# Patient Record
Sex: MALE | Race: BLACK OR AFRICAN AMERICAN | Employment: UNEMPLOYED | RURAL
[De-identification: names, ages, dates, MRNs, and addresses within clinical notes are randomized per-mention and may not be internally consistent; named-entity substitution may affect disease eponyms.]

---

## 2019-01-25 ENCOUNTER — OFFICE VISIT (OUTPATIENT)
Dept: FAMILY MEDICINE CLINIC | Age: 54
End: 2019-01-25

## 2019-01-25 VITALS
HEIGHT: 72 IN | SYSTOLIC BLOOD PRESSURE: 151 MMHG | WEIGHT: 237 LBS | RESPIRATION RATE: 20 BRPM | TEMPERATURE: 98.5 F | HEART RATE: 83 BPM | DIASTOLIC BLOOD PRESSURE: 97 MMHG | OXYGEN SATURATION: 97 % | BODY MASS INDEX: 32.1 KG/M2

## 2019-01-25 DIAGNOSIS — R60.9 DEPENDENT EDEMA: ICD-10-CM

## 2019-01-25 DIAGNOSIS — I10 ESSENTIAL HYPERTENSION: ICD-10-CM

## 2019-01-25 DIAGNOSIS — N18.5 TYPE 1 DIABETES MELLITUS WITH STAGE 5 CHRONIC KIDNEY DISEASE NOT ON CHRONIC DIALYSIS (HCC): Primary | ICD-10-CM

## 2019-01-25 DIAGNOSIS — N18.4 CKD (CHRONIC KIDNEY DISEASE), STAGE IV (HCC): ICD-10-CM

## 2019-01-25 DIAGNOSIS — M1A.9XX1 CHRONIC GOUT WITH TOPHUS, UNSPECIFIED CAUSE, UNSPECIFIED SITE: ICD-10-CM

## 2019-01-25 DIAGNOSIS — E10.22 TYPE 1 DIABETES MELLITUS WITH STAGE 5 CHRONIC KIDNEY DISEASE NOT ON CHRONIC DIALYSIS (HCC): Primary | ICD-10-CM

## 2019-01-25 DIAGNOSIS — D84.9 IMMUNOCOMPROMISED (HCC): ICD-10-CM

## 2019-01-25 DIAGNOSIS — Z94.0 KIDNEY TRANSPLANT RECIPIENT: ICD-10-CM

## 2019-01-25 RX ORDER — PRAVASTATIN SODIUM 40 MG/1
40 TABLET ORAL
COMMUNITY
Start: 2019-01-25 | End: 2021-12-20

## 2019-01-25 RX ORDER — ALLOPURINOL 100 MG/1
100 TABLET ORAL DAILY
Qty: 90 TAB | Refills: 3 | Status: SHIPPED | OUTPATIENT
Start: 2019-01-25 | End: 2021-12-20

## 2019-01-25 RX ORDER — BELATACEPT 250 MG/1
INJECTION, POWDER, LYOPHILIZED, FOR SOLUTION INTRAVENOUS
Refills: 0 | COMMUNITY
Start: 2019-01-17 | End: 2021-12-20

## 2019-01-25 RX ORDER — AMLODIPINE BESYLATE 10 MG/1
10 TABLET ORAL DAILY
Qty: 90 TAB | Refills: 3 | Status: SHIPPED | OUTPATIENT
Start: 2019-01-25 | End: 2021-12-20

## 2019-01-25 RX ORDER — HYDRALAZINE HYDROCHLORIDE 100 MG/1
100 TABLET, FILM COATED ORAL 3 TIMES DAILY
COMMUNITY
Start: 2019-01-25 | End: 2019-11-07 | Stop reason: SDUPTHER

## 2019-01-25 RX ORDER — MYCOPHENOLATE MOFETIL 500 MG/1
1000 TABLET ORAL 2 TIMES DAILY
Qty: 180 TAB | Refills: 3 | Status: SHIPPED | OUTPATIENT
Start: 2019-01-25 | End: 2019-07-30 | Stop reason: SDUPTHER

## 2019-01-25 RX ORDER — ALLOPURINOL 100 MG/1
100 TABLET ORAL DAILY
COMMUNITY
Start: 2019-01-25 | End: 2019-01-25 | Stop reason: SDUPTHER

## 2019-01-25 NOTE — PROGRESS NOTES
1. Have you been to the ER, urgent care clinic since your last visit? Hospitalized since your last visit? 2. Have you seen or consulted any other health care providers outside of the [address] [place] [address] since your last visit? Include any pap smears or colon screening. No 
Reviewed record in preparation for visit and have necessary documentation Pt did not bring medication to office visit for review Goals that were addressed and/or need to be completed during or after this appointment include Health Maintenance Due Topic Date Due  
 Hepatitis C Screening  1965  
 FOOT EXAM Q1  07/03/1975  MICROALBUMIN Q1  07/03/1975  
 EYE EXAM RETINAL OR DILATED  07/03/1975  Pneumococcal 19-64 Highest Risk (1 of 3 - PCV13) 07/03/1984  
 DTaP/Tdap/Td series (1 - Tdap) 07/03/1986  
 HEMOGLOBIN A1C Q6M  12/09/2010  LIPID PANEL Q1  05/07/2011  Shingrix Vaccine Age 50> (1 of 2) 07/03/2015  FOBT Q 1 YEAR AGE 50-75  07/03/2015  MEDICARE YEARLY EXAM  03/14/2018  Influenza Age 5 to Adult  08/01/2018 Will request records from [facility]

## 2019-01-25 NOTE — PROGRESS NOTES
[facility] Subjective:  
Uri Rosenthal is a 48 y.o. male with history of Deafness, HTN, CAD s/p CABG DMT1, CKD s/p Kidney transplant,  
CC: Establish Care, History provided by patient through sign  and Records HPI: 
Hypertension Follow up/CAD:  Had bypass about 5-10 years ago with 3 vessel bypass. Currently Taking Hydralazine 100 mg TID, Amlodipine 10 mg, Metoprolol 100 mg BID. The patient reports:  taking medications as instructed, no medication side effects noted, home BP monitoring in range of 403-129'H systolic over 09-08'B diastolic, no TIA's, no chest pain on exertion, no dyspnea on exertion, no swelling of ankles. BP Readings from Last 3 Encounters:  
01/25/19 (!) 151/97  
12/09/15 124/80  
08/15/13 121/68 GERD: Does take Dexilant, well controlled symptoms. Kidney Replacement/CKD: Followed by specialists at [facility]. Kidney transplant in 2013 or 2014. Currently on Cellcept, Nulojix, and Prednisone 5 mg daily Foot injury:  Work injury, smashed by jinny hammer and broke big toe. Initiall injury in August and had surgery in September to remove toes Gout: Takes Allopurinol, stable condition. HLD: Takes Pravastatin 40 mg. Diabetes Mellitus type 1:  Overall states BG is well controlled. Currently on Glaragine 75 units daily and Novolog mealtime scheduled. PFSH:  
 
Current Outpatient Medications on File Prior to Visit Medication Sig Dispense Refill  NULOJIX 250 mg injection BELATACEPT 525 MG / 100 ML NS IV OVER 30 MINUTES EXP:  0  
 hydrALAZINE (APRESOLINE) 100 mg tablet Take 1 Tab by mouth three (3) times daily.  pravastatin (PRAVACHOL) 40 mg tablet Take 1 Tab by mouth nightly.  metoprolol (LOPRESSOR) 100 mg tablet Take 100 mg by mouth two (2) times a day.  predniSONE (DELTASONE) 5 mg tablet Take 5 mg by mouth daily.  Dexlansoprazole (DEXILANT) 60 mg CpDM Take 30 mg by mouth daily (with breakfast).  insulin aspart (NOVOLOG) 100 unit/mL flexpen 10 Units by SubCUTAneous route daily. 10 units 9am 14units 12pm 10 units 5pm    
 aspirin 81 mg tablet Take  by mouth.  insulin glargine (LANTUS) 100 unit/mL injection 75 Units by SubCUTAneous route Daily (before breakfast). No current facility-administered medications on file prior to visit. Patient Active Problem List  
Diagnosis Code  Coronary artery disease I25.10  Other and unspecified hyperlipidemia E78.5  Deafness H91.90  Type 1 diabetes mellitus with stage 5 chronic kidney disease not on chronic dialysis ([facility]) E10.22, N18.5  CKD (chronic kidney disease), stage IV ([facility]) N18.4  
 HTN (hypertension) I10  
 Kidney replaced by transplant Z94.0  Immunocompromised ([facility] D84.9 Past Medical History:  
Diagnosis Date  CAD (coronary artery disease) Status post non-Q wave MI May 2010 with cardiac catheterization and a subsequent CABG with LIMA to LAD and sequential saphenous vein graft to posterolateral branch and PDA by Dr. Sage Sanchez.  Chronic kidney disease  Controlled type 1 diabetes mellitus with stage 5 chronic kidney disease not on chronic dialysis ([facility]  Deaf  Essential hypertension  Hyperlipidemia  Immunocompromised ([facility] Steroids, Transplant  Kidney replaced by transplant Past Surgical History:  
Procedure Laterality Date  CABG, VEIN, THREE May 2011  CARDIAC SURG PROCEDURE UNLIST  HX CORONARY ARTERY BYPASS GRAFT    
 LIMA to LAD and sequential saphenous vein graft to posterolateral branch and PDA by Dr. Sage Sanchez.  HX TRANSPLANT    
 kidnes 2013 Family History Problem Relation Age of Onset  Cancer Mother  Diabetes Mother  Cancer Father  Heart Disease Other Social History Socioeconomic History  Marital status:  Spouse name: Not on file  Number of children: Not on file  Years of education: Not on file  Highest education level: Not on file Social Needs  Financial resource strain: Not on file  Food insecurity - worry: Not on file  Food insecurity - inability: Not on file  Transportation needs - medical: Not on file  Transportation needs - non-medical: Not on file Occupational History  Not on file Tobacco Use  Smoking status: Passive Smoke Exposure - Never Smoker  Smokeless tobacco: Never Used Substance and Sexual Activity  Alcohol use: Yes Alcohol/week: 2.5 oz Types: 5 Cans of beer per week  Drug use: No  
 Sexual activity: Not Currently Partners: Female Other Topics Concern  Not on file Social History Narrative  Not on file Review of Systems Constitutional: Negative for chills, fever and weight loss. HENT:  
     Deaf Eyes: Negative for blurred vision and double vision. Respiratory: Negative for cough and shortness of breath. Cardiovascular: Negative for chest pain and palpitations. Gastrointestinal: Negative for abdominal pain, nausea and vomiting. Genitourinary: Negative for dysuria and urgency. Musculoskeletal: Negative for myalgias. Skin: Negative for itching and rash. Neurological: Negative for dizziness and headaches. Psychiatric/Behavioral: Negative for depression. Objective:  
 
Visit Vitals BP (!) 151/97 (BP 1 Location: Left arm, BP Patient Position: Sitting) Pulse 83 Temp 98.5 °F (36.9 °C) (Oral) Resp 20 Ht 6' (1.829 m) Wt 237 lb (107.5 kg) SpO2 97% BMI 32.14 kg/m² Physical Exam  
Constitutional: He is oriented to person, place, and time. He appears well-developed and well-nourished. No distress. HENT:  
Head: Normocephalic and atraumatic. Eyes: No scleral icterus. Neck: Normal range of motion. Neck supple. Cardiovascular: Normal rate, regular rhythm, normal heart sounds and intact distal pulses. Exam reveals no gallop and no friction rub. No murmur heard. Pulmonary/Chest: Effort normal and breath sounds normal.  
Abdominal: Soft. Bowel sounds are normal. He exhibits no distension. There is no tenderness. Musculoskeletal: Normal range of motion. He exhibits no edema. Lymphadenopathy:  
  He has no cervical adenopathy. Neurological: He is alert and oriented to person, place, and time. Skin: Skin is warm and dry. Psychiatric: He has a normal mood and affect. His behavior is normal. Judgment and thought content normal.  
Nursing note and vitals reviewed. Pertinent Labs/Studies: 
 
 
Assessment and orders: ICD-10-CM ICD-9-CM 1. Type 1 diabetes mellitus with stage 5 chronic kidney disease not on chronic dialysis (HCC) E10.22 250.41 HEMOGLOBIN A1C WITH EAG  
 Y64.3 354.7 METABOLIC PANEL, COMPREHENSIVE  
   CBC W/O DIFF  
   LIPID PANEL  
   PHOSPHORUS  
   MAGNESIUM 2. Kidney transplant recipient Z94.0 V42.0 mycophenolate (CELLCEPT) 500 mg tablet 3. Chronic gout with tophus, unspecified cause, unspecified site M1A. 9XX1 274.03 allopurinol (ZYLOPRIM) 100 mg tablet 4. Essential hypertension I10 401.9 amLODIPine (NORVASC) 10 mg tablet 5. Dependent edema R60.9 782.3 compr.stocking,knee,long,large (COMP STOCKING,KNEE,LONG,LARGE) misc 6. CKD (chronic kidney disease), stage IV ([facility]) N18.4 585.4 7. Immunocompromised ([facility] D84.9 279.3 Diagnoses and all orders for this visit: 
 
1. Type 1 diabetes mellitus with stage 5 chronic kidney disease not on chronic dialysis ([facility])/Basic labs today and requesting records from previous office. 
-     HEMOGLOBIN A1C WITH EAG 
-     METABOLIC PANEL, COMPREHENSIVE 
-     CBC W/O DIFF 
-     LIPID PANEL 
-     PHOSPHORUS 
-     MAGNESIUM 2. Kidney transplant recipient/CKD (chronic kidney disease), stage IV ([facility])/Immunocompromised ([facility]): Refill and follow up labs, requesting records. -     mycophenolate (CELLCEPT) 500 mg tablet; Take 2 Tabs by mouth two (2) times a day. 3. Chronic gout with tophus, unspecified cause, unspecified site: Refill 
-     allopurinol (ZYLOPRIM) 100 mg tablet; Take 1 Tab by mouth daily. 4. Essential hypertension: Refill 
-     amLODIPine (NORVASC) 10 mg tablet; Take 1 Tab by mouth daily. 5. Dependent edema: Discussed Compression stockings, ordered. -     compr.stocking,knee,long,large (COMP STOCKING,KNEE,LONG,LARGE) misc; Wear on the right leg for swelling. Goal 20-40 mmhg of pressure. Other orders 
-     varicella-zoster recombinant, PF, (SHINGRIX, PF,) 50 mcg/0.5 mL susr injection; 0.5 mL by IntraMUSCular route once for 1 dose. 
-     diphth, pertus,acell,, tetanus (BOOSTRIX TDAP) 2.5-8-5 Lf-mcg-Lf/0.5mL syrg; 0.5 mL by IntraMUSCular route once for 1 dose. -     pneumococcal 13 sylvia conj dip (PREVNAR-13) 0.5 mL syrg injection; 0.5 mL by IntraMUSCular route once for 1 dose. -     CKD REPORT 
-     DIABETES PATIENT EDUCATION Follow-up Disposition: 
Return in about 4 weeks (around 2/22/2019). I have discussed the diagnosis with the patient and the intended plan as seen in the above orders. Social history, medical history, and labs were reviewed. The patient has received an after-visit summary and questions were answered concerning future plans. I have discussed medication side effects and warnings with the patient as well. Griselda Nunn MD 
Resident [facility] 01/25/19 Patient discussed and seen with Dr. Juliette Montero, Attending Physician

## 2019-01-26 LAB
ALBUMIN SERPL-MCNC: 3.9 G/DL (ref 3.5–5.5)
ALBUMIN/GLOB SERPL: 0.8 {RATIO} (ref 1.2–2.2)
ALP SERPL-CCNC: 73 IU/L (ref 39–117)
ALT SERPL-CCNC: 15 IU/L (ref 0–44)
AST SERPL-CCNC: 17 IU/L (ref 0–40)
BILIRUB SERPL-MCNC: 0.3 MG/DL (ref 0–1.2)
BUN SERPL-MCNC: 29 MG/DL (ref 6–24)
BUN/CREAT SERPL: 16 (ref 9–20)
CALCIUM SERPL-MCNC: 9.9 MG/DL (ref 8.7–10.2)
CHLORIDE SERPL-SCNC: 101 MMOL/L (ref 96–106)
CHOLEST SERPL-MCNC: 194 MG/DL (ref 100–199)
CO2 SERPL-SCNC: 20 MMOL/L (ref 20–29)
CREAT SERPL-MCNC: 1.81 MG/DL (ref 0.76–1.27)
ERYTHROCYTE [DISTWIDTH] IN BLOOD BY AUTOMATED COUNT: 17.1 % (ref 12.3–15.4)
EST. AVERAGE GLUCOSE BLD GHB EST-MCNC: 209 MG/DL
GLOBULIN SER CALC-MCNC: 4.6 G/DL (ref 1.5–4.5)
GLUCOSE SERPL-MCNC: 271 MG/DL (ref 65–99)
HBA1C MFR BLD: 8.9 % (ref 4.8–5.6)
HCT VFR BLD AUTO: 42.6 % (ref 37.5–51)
HDLC SERPL-MCNC: 25 MG/DL
HGB BLD-MCNC: 13.8 G/DL (ref 13–17.7)
INTERPRETATION: NORMAL
LDLC SERPL CALC-MCNC: ABNORMAL MG/DL (ref 0–99)
Lab: NORMAL
MAGNESIUM SERPL-MCNC: 3.1 MG/DL (ref 1.6–2.3)
MCH RBC QN AUTO: 28.8 PG (ref 26.6–33)
MCHC RBC AUTO-ENTMCNC: 32.4 G/DL (ref 31.5–35.7)
MCV RBC AUTO: 89 FL (ref 79–97)
PHOSPHATE SERPL-MCNC: 3.2 MG/DL (ref 2.5–4.5)
PLATELET # BLD AUTO: 219 X10E3/UL (ref 150–379)
POTASSIUM SERPL-SCNC: 4.9 MMOL/L (ref 3.5–5.2)
PROT SERPL-MCNC: 8.5 G/DL (ref 6–8.5)
RBC # BLD AUTO: 4.8 X10E6/UL (ref 4.14–5.8)
SODIUM SERPL-SCNC: 137 MMOL/L (ref 134–144)
TRIGL SERPL-MCNC: 750 MG/DL (ref 0–149)
VLDLC SERPL CALC-MCNC: ABNORMAL MG/DL (ref 5–40)
WBC # BLD AUTO: 7.7 X10E3/UL (ref 3.4–10.8)

## 2019-01-28 NOTE — PROGRESS NOTES
Uncontrolled Diabetes mellitus type 1. Kidney function is improved, but with stage 3 CKD and patient with Kidney transplant, unclear of baseline over last 3 years. TG extremely elevated, though not fasting, but even so has been elevated for some time. Currently only on Pravastatin 40 mg. Phos normal, Mag mildly elevated. Will have patient RTC to discuss medication changes, will send request by letter.

## 2019-01-29 NOTE — PROGRESS NOTES
I saw and evaluated the patient with the resident, performing the key elements of the exam and service. I discussed the findings, assessment and plan with the resident and agree with the resident's findings and plan as documented in the resident's note. Bre Hartman M.D.

## 2019-02-01 ENCOUNTER — OFFICE VISIT (OUTPATIENT)
Dept: FAMILY MEDICINE CLINIC | Age: 54
End: 2019-02-01

## 2019-02-01 VITALS
DIASTOLIC BLOOD PRESSURE: 88 MMHG | HEART RATE: 90 BPM | OXYGEN SATURATION: 95 % | TEMPERATURE: 98 F | WEIGHT: 238 LBS | SYSTOLIC BLOOD PRESSURE: 143 MMHG | BODY MASS INDEX: 32.28 KG/M2

## 2019-02-01 DIAGNOSIS — Z94.0 KIDNEY REPLACED BY TRANSPLANT: ICD-10-CM

## 2019-02-01 DIAGNOSIS — E10.22 TYPE 1 DIABETES MELLITUS WITH STAGE 5 CHRONIC KIDNEY DISEASE NOT ON CHRONIC DIALYSIS (HCC): Primary | ICD-10-CM

## 2019-02-01 DIAGNOSIS — N18.5 TYPE 1 DIABETES MELLITUS WITH STAGE 5 CHRONIC KIDNEY DISEASE NOT ON CHRONIC DIALYSIS (HCC): Primary | ICD-10-CM

## 2019-02-01 NOTE — PROGRESS NOTES
[facility]    Subjective:   Nate Webster is a 48 y.o. male with history of Uncontrolled Type Diabetes Mellitus, HTN, CKD s/p Kidney transplant  CC: Diabetes follow up  History provided by patient and Records    HPI:  Diabetes Follow up: Overall the patient feels well with good energy level. Insulin dependence: Takes 75 units of Lantus in the morning, Takes Novolog 10 units at breakfast, and 14 units at lunch, 10 units and dinner. Frequency of home glucose testing: daily with morning and with meals   Blood Sugar range at home: BG in the 185-240 range normally    Low blood sugar symptoms: No   Dietary compliance: Good   Medication compliance: Good   On ASA: Yes   Tobacco Use: None   Depression: No     CKD s/p Kidney Transplant: Trying to get Mycophenolate, reports costing 200$ at pharmacy, normally costs about 60$. PFSH:     Current Outpatient Medications on File Prior to Visit   Medication Sig Dispense Refill    NULOJIX 250 mg injection BELATACEPT 525 MG / 100 ML NS IV OVER 30 MINUTES EXP:  0    hydrALAZINE (APRESOLINE) 100 mg tablet Take 1 Tab by mouth three (3) times daily.  pravastatin (PRAVACHOL) 40 mg tablet Take 1 Tab by mouth nightly.  allopurinol (ZYLOPRIM) 100 mg tablet Take 1 Tab by mouth daily. 90 Tab 3    amLODIPine (NORVASC) 10 mg tablet Take 1 Tab by mouth daily. 90 Tab 3    mycophenolate (CELLCEPT) 500 mg tablet Take 2 Tabs by mouth two (2) times a day. 180 Tab 3    compr.stocking,knee,long,large (COMP STOCKING,KNEE,LONG,LARGE) misc Wear on the right leg for swelling. Goal 20-40 mmhg of pressure. 1 Package 1    metoprolol (LOPRESSOR) 100 mg tablet Take 100 mg by mouth two (2) times a day.  predniSONE (DELTASONE) 5 mg tablet Take 5 mg by mouth daily.  Dexlansoprazole (DEXILANT) 60 mg CpDM Take 30 mg by mouth daily (with breakfast).  insulin aspart (NOVOLOG) 100 unit/mL flexpen 10 Units by SubCUTAneous route daily.  10 units 9am 14units 12pm 10 units 5pm      aspirin 81 mg tablet Take  by mouth.  insulin glargine (LANTUS) 100 unit/mL injection 75 Units by SubCUTAneous route Daily (before breakfast). No current facility-administered medications on file prior to visit. Patient Active Problem List   Diagnosis Code    Coronary artery disease I25.10    Other and unspecified hyperlipidemia E78.5    Deafness H91.90    Type 1 diabetes mellitus with stage 5 chronic kidney disease not on chronic dialysis (HCC) E10.22, N18.5    CKD (chronic kidney disease), stage IV (HCC) N18.4    HTN (hypertension) I10    Kidney replaced by transplant Z94.0    Immunocompromised ([facility] D84.9       Social History     Socioeconomic History    Marital status: SINGLE     Spouse name: Not on file    Number of children: Not on file    Years of education: Not on file    Highest education level: Not on file   Social Needs    Financial resource strain: Not on file    Food insecurity - worry: Not on file    Food insecurity - inability: Not on file   [employer] needs - medical: Not on file   [employer] needs - non-medical: Not on file   Occupational History    Not on file   Tobacco Use    Smoking status: Passive Smoke Exposure - Never Smoker    Smokeless tobacco: Never Used   Substance and Sexual Activity    Alcohol use: Yes     Alcohol/week: 2.5 oz     Types: 5 Cans of beer per week    Drug use: No    Sexual activity: Not Currently     Partners: Female   Other Topics Concern    Not on file   Social History Narrative    Not on file       Review of Systems   Constitutional: Negative for malaise/fatigue. Gastrointestinal: Negative for abdominal pain, nausea and vomiting. Genitourinary: Negative for flank pain, frequency, hematuria and urgency. Neurological: Negative for dizziness and headaches. Endo/Heme/Allergies: Negative for polydipsia.          Objective:     Visit Vitals  /88 (BP 1 Location: Left arm, BP Patient Position: Sitting)   Pulse 90   Temp 98 °F (36.7 °C) (Oral)   Wt 238 lb (108 kg)   SpO2 95%   BMI 32.28 kg/m²          Physical Exam   Constitutional: He is oriented to person, place, and time. He appears well-developed and well-nourished. HENT:   Head: Normocephalic and atraumatic. Neck: Normal range of motion. Neck supple. Cardiovascular: Normal rate, regular rhythm, normal heart sounds and intact distal pulses. Exam reveals no gallop and no friction rub. No murmur heard. Pulmonary/Chest: Effort normal and breath sounds normal.   Abdominal: Soft. Bowel sounds are normal. He exhibits no distension. There is no tenderness. Neurological: He is alert and oriented to person, place, and time. Skin: Skin is warm and dry. Nursing note and vitals reviewed. Pertinent Labs/Studies:  Lab Results   Component Value Date/Time    Sodium 137 01/25/2019 11:38 AM    Potassium 4.9 01/25/2019 11:38 AM    Chloride 101 01/25/2019 11:38 AM    CO2 20 01/25/2019 11:38 AM    Anion gap 8 12/08/2015 11:50 PM    Glucose 271 (H) 01/25/2019 11:38 AM    BUN 29 (H) 01/25/2019 11:38 AM    Creatinine 1.81 (H) 01/25/2019 11:38 AM    BUN/Creatinine ratio 16 01/25/2019 11:38 AM    GFR est AA 48 (L) 01/25/2019 11:38 AM    GFR est non-AA 42 (L) 01/25/2019 11:38 AM    Calcium 9.9 01/25/2019 11:38 AM    Bilirubin, total 0.3 01/25/2019 11:38 AM    AST (SGOT) 17 01/25/2019 11:38 AM    Alk.  phosphatase 73 01/25/2019 11:38 AM    Protein, total 8.5 01/25/2019 11:38 AM    Albumin 3.9 01/25/2019 11:38 AM    Globulin 5.5 (H) 12/08/2015 11:50 PM    A-G Ratio 0.8 (L) 01/25/2019 11:38 AM    ALT (SGPT) 15 01/25/2019 11:38 AM       Lab Results   Component Value Date/Time    Hemoglobin A1c 8.9 (H) 01/25/2019 11:38 AM       Lab Results   Component Value Date/Time    Cholesterol, total 194 01/25/2019 11:38 AM    HDL Cholesterol 25 (L) 01/25/2019 11:38 AM    LDL,Direct 80 05/07/2010 04:15 AM    LDL, calculated Comment 01/25/2019 11:38 AM    Triglyceride 750 (HH) 01/25/2019 11:38 AM    CHOL/HDL Ratio 9.7 (H) 05/07/2010 04:15 AM       Lab Results   Component Value Date/Time    WBC 7.7 01/25/2019 11:38 AM    Hemoglobin (POC) 10.2 (L) 08/14/2013 11:20 PM    HGB 13.8 01/25/2019 11:38 AM    Hematocrit (POC) 30 (L) 08/14/2013 11:20 PM    HCT 42.6 01/25/2019 11:38 AM    PLATELET 934 86/90/5081 11:38 AM    MCV 89 01/25/2019 11:38 AM         Assessment and orders:       ICD-10-CM ICD-9-CM    1. Type 1 diabetes mellitus with stage 5 chronic kidney disease not on chronic dialysis ([facility]) E10.22 250.41     N18.5 585.5    2. Kidney replaced by transplant Z94.0 V42.0      Diagnoses and all orders for this visit:    1. Type 1 diabetes mellitus with stage 5 chronic kidney disease not on chronic dialysis [facility]): Uncontrolled Diabetes. Plan for patient to increase Lantus by 2 unit increments every 2-3 days based on BG, goal to get average BG around 150. Follow up in 2 weeks with review of BG logs at that visit. 2. Kidney replaced by transplant: Attempted to place Prior Authorization, but patient needs to follow up with pharmacy as Insurance card may not be up to date at pharmacy. Also given paperwork for medication assistance. Also advised to contact Kidney Transplant team, may be able to assist or send PA. Follow-up Disposition:  Return in about 2 weeks (around 2/15/2019) for Diabetes Class, -Due for a Medicare Wellness Visit. I have discussed the diagnosis with the patient and the intended plan as seen in the above orders. Social history, medical history, and labs were reviewed. The patient has received an after-visit summary and questions were answered concerning future plans. I have discussed medication side effects and warnings with the patient as well.     Sherren Early, MD  Resident [facility]  02/01/19    Patient discussed and seen with Dr. Jannette Rm, Attending Physician

## 2019-02-01 NOTE — PROGRESS NOTES
1. Have you been to the ER, urgent care clinic since your last visit? Hospitalized since your last visit? No    2. Have you seen or consulted any other health care providers outside of the [address] [place] [address] since your last visit? Include any pap smears or colon screening.  No  Reviewed record in preparation for visit and have necessary documentation  Pt did not bring medication to office visit for review    Goals that were addressed and/or need to be completed during or after this appointment include   Health Maintenance Due   Topic Date Due    Hepatitis C Screening  1965    FOOT EXAM Q1  07/03/1975    MICROALBUMIN Q1  07/03/1975    EYE EXAM RETINAL OR DILATED  07/03/1975    Pneumococcal 19-64 Highest Risk (1 of 3 - PCV13) 07/03/1984    DTaP/Tdap/Td series (1 - Tdap) 07/03/1986    Shingrix Vaccine Age 50> (1 of 2) 07/03/2015    FOBT Q 1 YEAR AGE 50-75  07/03/2015    MEDICARE YEARLY EXAM  03/14/2018

## 2019-02-01 NOTE — PATIENT INSTRUCTIONS
Increase you Lantus by 2 units every 2-3 days based on your Blood Glucose. Your goal average Blood glucose is 150. As an example:  - Tomorrow (19) increase you Lantus to 77 units  - On Monday (19) Increase your Lantus to 79 units if your average Blood Glucose is greater than 150  - On Thursday (19) increase your Lantus to 81 units if your average Blood glucose is greater than 150    Continue this until average Blood Glucose (Your fasting Blood glucose in particular), is around 150. Please come back to see me in 2 weeks to review your blood sugar logs. Javier Campbell with [facility]  [address] [place] [address], [place] [address]., [address]  [phone]    Monitor blood pressure outside the office several times weekly at different times during the day and evening. Bring the record to me in 3 weeks for review. Blood Pressure Record     Patient Name:  ______________________ :  ______________________    Date/Time BP Reading Pulse                                                                                                                                                                                                Home Blood Pressure Test: About This Test  What is it? A home blood pressure test allows you to keep track of your blood pressure at home. Blood pressure is a measure of the force of blood against the walls of your arteries. Blood pressure readings include two numbers, such as 130/80 (say \"130 over 80\"). The first number is the systolic pressure. The second number is the diastolic pressure. Why is this test done? You may do this test at home to:  · Find out if you have high blood pressure. · Track your blood pressure if you have high blood pressure. · Track how well medicine is working to reduce high blood pressure. · Check how lifestyle changes, such as weight loss and exercise, are affecting blood pressure.   How can you prepare for the test?  · Do not use caffeine, tobacco, or medicines known to raise blood pressure (such as nasal decongestant sprays) for at least 30 minutes before taking your blood pressure. · Do not exercise for at least 30 minutes before taking your blood pressure. What happens before the test?  Take your blood pressure while you feel comfortable and relaxed. Sit quietly with both feet on the floor for at least 5 minutes before the test.  What happens during the test?  · Sit with your arm slightly bent and resting on a table so that your upper arm is at the same level as your heart. · Roll up your sleeve or take off your shirt to expose your upper arm. · Wrap the blood pressure cuff around your upper arm so that the lower edge of the cuff is about 1 inch above the bend of your elbow. Proceed with the following steps depending on if you are using an automatic or manual pressure monitor. Automatic blood pressure monitors  · Press the on/off button on the automatic monitor and wait until the ready-to-measure \"heart\" symbol appears next to zero in the display window. · Press the start button. The cuff will inflate and deflate by itself. · Your blood pressure numbers will appear on the screen. · Write your numbers in your log book, along with the date and time. Manual blood pressure monitors  · Place the earpieces of a stethoscope in your ears, and place the bell of the stethoscope over the artery, just below the cuff. · Close the valve on the rubber inflating bulb. · Squeeze the bulb rapidly with your opposite hand to inflate the cuff until the dial or column of mercury reads about 30 mm Hg higher than your usual systolic pressure. If you do not know your usual pressure, inflate the cuff to 210 mm Hg or until the pulse at your wrist disappears. · Open the pressure valve just slightly by twisting or pressing the valve on the bulb.   · As you watch the pressure slowly fall, note the level on the dial at which you first start to hear a pulsing or tapping sound through the stethoscope. This is your systolic blood pressure. · Continue letting the air out slowly. The sounds will become muffled and will finally disappear. Note the pressure when the sounds completely disappear. This is your diastolic blood pressure. Let out all the remaining air. · Write your numbers in your log book, along with the date and time. What else should you know about the test?  It is more accurate to take the average of several readings made throughout the day than to rely on a single reading. It's normal for blood pressure to go up and down throughout the day. Follow-up care is a key part of your treatment and safety. Be sure to make and go to all appointments, and call your doctor if you are having problems. It's also a good idea to keep a list of the medicines you take. Where can you learn more? Go to http://christine-beverly.info/. Enter C427 in the search box to learn more about \"Home Blood Pressure Test: About This Test.\"  Current as of: July 22, 2018  Content Version: 11.9  © 8944-7251 [employer]. Care instructions adapted under license by [employer] (which disclaims liability or warranty for this information). If you have questions about a medical condition or this instruction, always ask your healthcare professional. [address] any warranty or liability for your use of this information.

## 2019-02-01 NOTE — PROGRESS NOTES
I saw and evaluated the patient, performing the key elements of the service. I discussed the findings, assessment and plan with the resident and agree with the resident's findings and plan as documented in the resident's note. Per report, he is not having any BG less than 180, so will start by increasing his basal. He may require titration of his mealtime as well, but will need records to do that safely.

## 2019-02-13 ENCOUNTER — OP HISTORICAL/CONVERTED ENCOUNTER (OUTPATIENT)
Dept: OTHER | Age: 54
End: 2019-02-13

## 2019-02-14 ENCOUNTER — OFFICE VISIT (OUTPATIENT)
Dept: FAMILY MEDICINE CLINIC | Age: 54
End: 2019-02-14

## 2019-02-14 VITALS
TEMPERATURE: 98.5 F | RESPIRATION RATE: 18 BRPM | HEART RATE: 81 BPM | DIASTOLIC BLOOD PRESSURE: 80 MMHG | WEIGHT: 237.8 LBS | HEIGHT: 72 IN | OXYGEN SATURATION: 99 % | SYSTOLIC BLOOD PRESSURE: 147 MMHG | BODY MASS INDEX: 32.21 KG/M2

## 2019-02-14 DIAGNOSIS — N18.5 TYPE 1 DIABETES MELLITUS WITH STAGE 5 CHRONIC KIDNEY DISEASE NOT ON CHRONIC DIALYSIS (HCC): Primary | ICD-10-CM

## 2019-02-14 DIAGNOSIS — E08.3213 DIABETES MELLITUS DUE TO UNDERLYING CONDITION WITH BOTH EYES AFFECTED BY MILD NONPROLIFERATIVE RETINOPATHY AND MACULAR EDEMA, WITH LONG-TERM CURRENT USE OF INSULIN (HCC): ICD-10-CM

## 2019-02-14 DIAGNOSIS — E10.22 TYPE 1 DIABETES MELLITUS WITH STAGE 5 CHRONIC KIDNEY DISEASE NOT ON CHRONIC DIALYSIS (HCC): Primary | ICD-10-CM

## 2019-02-14 DIAGNOSIS — Z79.4 DIABETES MELLITUS DUE TO UNDERLYING CONDITION WITH BOTH EYES AFFECTED BY MILD NONPROLIFERATIVE RETINOPATHY AND MACULAR EDEMA, WITH LONG-TERM CURRENT USE OF INSULIN (HCC): ICD-10-CM

## 2019-02-14 LAB
LEFT EYE DIABETIC RETINOPATHY: ABNORMAL
LEFT EYE IMAGE QUALITY: ABNORMAL
LEFT EYE MACULAR EDEMA: ABNORMAL
LEFT EYE OTHER RETINOPATHY: ABNORMAL
RESULT: ABNORMAL
RIGHT EYE DIABETIC RETINOPATHY: ABNORMAL
RIGHT EYE IMAGE QUALITY: ABNORMAL
RIGHT EYE MACULAR EDEMA: ABNORMAL
RIGHT EYE OTHER RETINOPATHY: ABNORMAL
SEVERITY: ABNORMAL

## 2019-02-14 RX ORDER — INSULIN GLARGINE 100 [IU]/ML
39 INJECTION, SOLUTION SUBCUTANEOUS 2 TIMES DAILY
Qty: 1 VIAL | Refills: 1 | Status: SHIPPED | OUTPATIENT
Start: 2019-02-14 | End: 2019-04-08 | Stop reason: SDUPTHER

## 2019-02-14 RX ORDER — INSULIN ASPART 100 [IU]/ML
10 INJECTION, SOLUTION INTRAVENOUS; SUBCUTANEOUS DAILY
Qty: 15 ML | Refills: 1 | Status: SHIPPED | OUTPATIENT
Start: 2019-02-14 | End: 2019-11-21 | Stop reason: SDUPTHER

## 2019-02-14 NOTE — PROGRESS NOTES
I saw and evaluated the patient with the resident, performing the key elements of the exam and service. I discussed the findings, assessment and plan with the resident and agree with the resident's findings and plan as documented in the resident's note. Vera Jon M.D.

## 2019-02-14 NOTE — PROGRESS NOTES
[facility] Subjective:  
Yaritza Stone is a 48 y.o. male with history of Deafness, DM type 1 with ESRD s/p Kidney Transplant, HTN, CAD 
CC: Diabetes follow up History provided by patient and Records HPI: 
Diabetes Follow up: Returning after following up BG, occasionally taking 77 units, still mostly 75 units daily. Has improved diet significantly since last seen Insulin dependence: Yes, currently taking Frequency of home glucose testing: daily Blood Sugar range at home: In the morning 150-160's,  In the afternoon no higher than 200's. Last eye exam: In past 12 months. Last foot exam: This year. Polyuria, polyphagia or polydipsia: No 
 Retinopathy: No 
 Neuropathy SX: No  
 Low blood sugar symptoms: No 
 Dietary compliance: Good Medication compliance: Good On ASA: Yes Tobacco Use: None Depression: No 
  
Wt Readings from Last 3 Encounters:  
02/14/19 237 lb 12.8 oz (107.9 kg) 02/01/19 238 lb (108 kg) 01/25/19 237 lb (107.5 kg) PFSH:  
 
Current Outpatient Medications on File Prior to Visit Medication Sig Dispense Refill  NULOJIX 250 mg injection BELATACEPT 525 MG / 100 ML NS IV OVER 30 MINUTES EXP:  0  
 hydrALAZINE (APRESOLINE) 100 mg tablet Take 1 Tab by mouth three (3) times daily.  pravastatin (PRAVACHOL) 40 mg tablet Take 1 Tab by mouth nightly.  allopurinol (ZYLOPRIM) 100 mg tablet Take 1 Tab by mouth daily. 90 Tab 3  
 amLODIPine (NORVASC) 10 mg tablet Take 1 Tab by mouth daily. 90 Tab 3  
 mycophenolate (CELLCEPT) 500 mg tablet Take 2 Tabs by mouth two (2) times a day. 180 Tab 3  compr.stocking,knee,long,large (COMP STOCKING,KNEE,LONG,LARGE) misc Wear on the right leg for swelling. Goal 20-40 mmhg of pressure. 1 Package 1  
 metoprolol (LOPRESSOR) 100 mg tablet Take 100 mg by mouth two (2) times a day.  predniSONE (DELTASONE) 5 mg tablet Take 5 mg by mouth daily.  Dexlansoprazole (DEXILANT) 60 mg CpDM Take 30 mg by mouth daily (with breakfast).  insulin aspart (NOVOLOG) 100 unit/mL flexpen 10 Units by SubCUTAneous route daily. 10 units 9am 14units 12pm 10 units 5pm    
 aspirin 81 mg tablet Take  by mouth.  insulin glargine (LANTUS) 100 unit/mL injection 75 Units by SubCUTAneous route Daily (before breakfast). No current facility-administered medications on file prior to visit. Patient Active Problem List  
Diagnosis Code  Coronary artery disease I25.10  Other and unspecified hyperlipidemia E78.5  Deafness H91.90  Type 1 diabetes mellitus with stage 5 chronic kidney disease not on chronic dialysis (HCC) E10.22, N18.5  CKD (chronic kidney disease), stage IV (HCC) N18.4  
 HTN (hypertension) I10  
 Kidney replaced by transplant Z94.0  Immunocompromised ([facility] [address] D84.9 Social History Socioeconomic History  Marital status: SINGLE Spouse name: Not on file  Number of children: Not on file  Years of education: Not on file  Highest education level: Not on file Social Needs  Financial resource strain: Not on file  Food insecurity - worry: Not on file  Food insecurity - inability: Not on file  Transportation needs - medical: Not on file  Transportation needs - non-medical: Not on file Occupational History  Not on file Tobacco Use  Smoking status: Passive Smoke Exposure - Never Smoker  Smokeless tobacco: Never Used Substance and Sexual Activity  Alcohol use: Yes Alcohol/week: 2.5 oz Types: 5 Cans of beer per week  Drug use: No  
 Sexual activity: Not Currently Partners: Female Other Topics Concern  Not on file Social History Narrative  Not on file Review of Systems Constitutional: Negative for chills, fever, malaise/fatigue and weight loss. Respiratory: Negative for cough and hemoptysis. Cardiovascular: Negative for chest pain and palpitations. Gastrointestinal: Negative for abdominal pain, nausea and vomiting. Genitourinary: Negative for frequency and urgency. Objective:  
 
Visit Vitals /80 (BP 1 Location: Left arm, BP Patient Position: Sitting) Pulse 81 Temp 98.5 °F (36.9 °C) (Oral) Resp 18 Ht 6' (1.829 m) Wt 237 lb 12.8 oz (107.9 kg) SpO2 99% BMI 32.25 kg/m² Physical Exam  
Constitutional: He appears well-developed and well-nourished. No distress. HENT:  
Head: Normocephalic and atraumatic. Cardiovascular: Normal rate, regular rhythm, normal heart sounds and intact distal pulses. Exam reveals no gallop and no friction rub. No murmur heard. Pulmonary/Chest: Effort normal and breath sounds normal.  
Abdominal: Soft. Bowel sounds are normal.  
Psychiatric: He has a normal mood and affect. His behavior is normal.  
Nursing note and vitals reviewed. Pertinent Labs/Studies: 
 
 
Assessment and orders: ICD-10-CM ICD-9-CM 1. Type 1 diabetes mellitus with stage 5 chronic kidney disease not on chronic dialysis ([facility]) E10.22 250.41 FUNDUS PHOTOGRAPHY  
 N18.5 585.5 insulin glargine (LANTUS U-100 INSULIN) 100 unit/mL injection  
   insulin aspart U-100 (NOVOLOG) 100 unit/mL inpn 2. 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  
 Z79.4 362.04   
  362.07 V58.67 Diagnoses and all orders for this visit: 
 
1. Type 1 diabetes mellitus with stage 5 chronic kidney disease not on chronic dialysis [facility]): Increase Lantus and splitting to BID dosing now. 39 Units BID, continue current scheduled meal time and follow up. Fundal exam today. -     FUNDUS PHOTOGRAPHY 
-     insulin glargine (LANTUS U-100 INSULIN) 100 unit/mL injection; 39 Units by SubCUTAneous route two (2) times a day. -     insulin aspart U-100 (NOVOLOG) 100 unit/mL inpn; 10 Units by SubCUTAneous route daily. 10 units 9am 14units 12pm 10 units 5pm 
 
2. 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  
-     FUNDUS PHOTOGRAPHY Follow-up Disposition: 
Return in about 2 months (around 4/14/2019) for Diabetes. I have discussed the diagnosis with the patient and the intended plan as seen in the above orders. Social history, medical history, and labs were reviewed. The patient has received an after-visit summary and questions were answered concerning future plans. I have discussed medication side effects and warnings with the patient as well. Carine Salazar MD 
Resident [facility] 02/14/19 Patient discussed and seen with Dr. Irvin Conte, Attending Physician

## 2019-02-14 NOTE — PROGRESS NOTES
1. Have you been to the ER, urgent care clinic since your last visit? Hospitalized since your last visit? No 
 
2. Have you seen or consulted any other health care providers outside of the [address] [place] [address] since your last visit? Include any pap smears or colon screening. No 
Reviewed record in preparation for visit and have necessary documentation Pt did not bring medication to office visit for review Information was given to pt on Advanced Directives, Living Will Information was given on Shingles Vaccine 
opportunity was given for questions Goals that were addressed and/or need to be completed during or after this appointment include Health Maintenance Due Topic Date Due  
 Hepatitis C Screening  1965  
 FOOT EXAM Q1  07/03/1975  MICROALBUMIN Q1  07/03/1975  
 EYE EXAM RETINAL OR DILATED  07/03/1975  Pneumococcal 19-64 Highest Risk (1 of 3 - PCV13) 07/03/1984  
 DTaP/Tdap/Td series (1 - Tdap) 07/03/1986  Shingrix Vaccine Age 50> (1 of 2) 07/03/2015  FOBT Q 1 YEAR AGE 50-75  07/03/2015  MEDICARE YEARLY EXAM  03/14/2018

## 2019-02-21 ENCOUNTER — OP HISTORICAL/CONVERTED ENCOUNTER (OUTPATIENT)
Dept: OTHER | Age: 54
End: 2019-02-21

## 2019-02-21 ENCOUNTER — IP HISTORICAL/CONVERTED ENCOUNTER (OUTPATIENT)
Dept: OTHER | Age: 54
End: 2019-02-21

## 2019-03-11 ENCOUNTER — OP HISTORICAL/CONVERTED ENCOUNTER (OUTPATIENT)
Dept: OTHER | Age: 54
End: 2019-03-11

## 2019-03-12 ENCOUNTER — OP HISTORICAL/CONVERTED ENCOUNTER (OUTPATIENT)
Dept: OTHER | Age: 54
End: 2019-03-12

## 2019-03-25 ENCOUNTER — OP HISTORICAL/CONVERTED ENCOUNTER (OUTPATIENT)
Dept: OTHER | Age: 54
End: 2019-03-25

## 2019-04-01 ENCOUNTER — OP HISTORICAL/CONVERTED ENCOUNTER (OUTPATIENT)
Dept: OTHER | Age: 54
End: 2019-04-01

## 2019-04-08 ENCOUNTER — OP HISTORICAL/CONVERTED ENCOUNTER (OUTPATIENT)
Dept: OTHER | Age: 54
End: 2019-04-08

## 2019-04-08 DIAGNOSIS — E10.22 TYPE 1 DIABETES MELLITUS WITH STAGE 5 CHRONIC KIDNEY DISEASE NOT ON CHRONIC DIALYSIS (HCC): ICD-10-CM

## 2019-04-08 DIAGNOSIS — N18.5 TYPE 1 DIABETES MELLITUS WITH STAGE 5 CHRONIC KIDNEY DISEASE NOT ON CHRONIC DIALYSIS (HCC): ICD-10-CM

## 2019-04-09 RX ORDER — INSULIN GLARGINE 100 [IU]/ML
INJECTION, SOLUTION SUBCUTANEOUS
Qty: 1 VIAL | Refills: 3 | Status: SHIPPED | OUTPATIENT
Start: 2019-04-09 | End: 2019-04-16 | Stop reason: SDUPTHER

## 2019-04-12 ENCOUNTER — OP HISTORICAL/CONVERTED ENCOUNTER (OUTPATIENT)
Dept: OTHER | Age: 54
End: 2019-04-12

## 2019-04-15 ENCOUNTER — OP HISTORICAL/CONVERTED ENCOUNTER (OUTPATIENT)
Dept: OTHER | Age: 54
End: 2019-04-15

## 2019-04-22 ENCOUNTER — OP HISTORICAL/CONVERTED ENCOUNTER (OUTPATIENT)
Dept: OTHER | Age: 54
End: 2019-04-22

## 2019-04-29 ENCOUNTER — OP HISTORICAL/CONVERTED ENCOUNTER (OUTPATIENT)
Dept: OTHER | Age: 54
End: 2019-04-29

## 2019-05-20 ENCOUNTER — OP HISTORICAL/CONVERTED ENCOUNTER (OUTPATIENT)
Dept: OTHER | Age: 54
End: 2019-05-20

## 2019-06-03 ENCOUNTER — OP HISTORICAL/CONVERTED ENCOUNTER (OUTPATIENT)
Dept: OTHER | Age: 54
End: 2019-06-03

## 2019-06-03 ENCOUNTER — IP HISTORICAL/CONVERTED ENCOUNTER (OUTPATIENT)
Dept: OTHER | Age: 54
End: 2019-06-03

## 2019-06-28 ENCOUNTER — TELEPHONE (OUTPATIENT)
Dept: FAMILY MEDICINE CLINIC | Age: 54
End: 2019-06-28

## 2019-06-28 NOTE — TELEPHONE ENCOUNTER
Spoke with Emmett Headley, [facility] nurse. She stated the patient has had a temp of 102.8 since Tues and N/V. Pt recently just had an amputation. His pulse rate is 116. She wanted him to come in for an appt but the pt and her were advised that he should go to an urgent care or ER. Pt verbalized understanding.

## 2019-07-05 ENCOUNTER — TELEPHONE (OUTPATIENT)
Dept: FAMILY MEDICINE CLINIC | Age: 54
End: 2019-07-05

## 2019-07-05 NOTE — TELEPHONE ENCOUNTER
----- Message from Kelly Corral sent at 7/3/2019  3:17 PM EDT -----  Regarding: /Telephone   General Message/Vendor Calls      Caller's first and last name: Lennie Frank       Reason for call: Lennie Frank from [facility] is requesting a call back to confirm is a doctor from this practice going to follow patient for home health and if so what Doctor would that be       Callback required yes/no and why: Yes      Best contact number(s): [phone]

## 2019-07-05 NOTE — TELEPHONE ENCOUNTER
We can follow him here but he will need a transition of care appt first and we will need his records. Per the records I can see, we were not aware of an admission.

## 2019-07-05 NOTE — TELEPHONE ENCOUNTER
Left message on Nini's voice mail that we can follow patient however, he will need an appointment and records.

## 2019-07-12 ENCOUNTER — PATIENT OUTREACH (OUTPATIENT)
Dept: FAMILY MEDICINE CLINIC | Age: 54
End: 2019-07-12

## 2019-07-12 NOTE — PROGRESS NOTES
NN unable to reach patient or family to complete LEILA. Voicemail left requesting return call. Patient has PCP appt 7/16/19.

## 2019-07-16 ENCOUNTER — OFFICE VISIT (OUTPATIENT)
Dept: FAMILY MEDICINE CLINIC | Age: 54
End: 2019-07-16

## 2019-07-16 ENCOUNTER — PATIENT OUTREACH (OUTPATIENT)
Dept: FAMILY MEDICINE CLINIC | Age: 54
End: 2019-07-16

## 2019-07-16 VITALS
HEART RATE: 74 BPM | DIASTOLIC BLOOD PRESSURE: 92 MMHG | SYSTOLIC BLOOD PRESSURE: 153 MMHG | TEMPERATURE: 97.5 F | RESPIRATION RATE: 16 BRPM | OXYGEN SATURATION: 97 %

## 2019-07-16 DIAGNOSIS — E10.22 TYPE 1 DIABETES MELLITUS WITH STAGE 5 CHRONIC KIDNEY DISEASE NOT ON CHRONIC DIALYSIS (HCC): ICD-10-CM

## 2019-07-16 DIAGNOSIS — N18.30 STAGE 3 CHRONIC KIDNEY DISEASE (HCC): ICD-10-CM

## 2019-07-16 DIAGNOSIS — Z89.511 HX OF RIGHT BKA (HCC): ICD-10-CM

## 2019-07-16 DIAGNOSIS — Z94.0 RENAL TRANSPLANT RECIPIENT: ICD-10-CM

## 2019-07-16 DIAGNOSIS — N18.5 TYPE 1 DIABETES MELLITUS WITH STAGE 5 CHRONIC KIDNEY DISEASE NOT ON CHRONIC DIALYSIS (HCC): ICD-10-CM

## 2019-07-16 DIAGNOSIS — R78.81 BACTEREMIA: ICD-10-CM

## 2019-07-16 DIAGNOSIS — N12 PYELONEPHRITIS: Primary | ICD-10-CM

## 2019-07-16 DIAGNOSIS — J81.0 ACUTE PULMONARY EDEMA (HCC): ICD-10-CM

## 2019-07-16 RX ORDER — CHOLECALCIFEROL (VITAMIN D3) 125 MCG
4000 CAPSULE ORAL DAILY
COMMUNITY
End: 2021-12-20

## 2019-07-16 RX ORDER — CEFTRIAXONE SODIUM 2 G/1
2 INJECTION, POWDER, FOR SOLUTION INTRAVENOUS EVERY 24 HOURS
COMMUNITY
End: 2019-08-02

## 2019-07-16 RX ORDER — TAMSULOSIN HYDROCHLORIDE 0.4 MG/1
0.4 CAPSULE ORAL DAILY
COMMUNITY
End: 2021-12-20

## 2019-07-16 NOTE — PATIENT INSTRUCTIONS
A Healthy Lifestyle: Care Instructions Your Care Instructions A healthy lifestyle can help you feel good, stay at a healthy weight, and have plenty of energy for both work and play. A healthy lifestyle is something you can share with your whole family. A healthy lifestyle also can lower your risk for serious health problems, such as high blood pressure, heart disease, and diabetes. You can follow a few steps listed below to improve your health and the health of your family. Follow-up care is a key part of your treatment and safety. Be sure to make and go to all appointments, and call your doctor if you are having problems. It's also a good idea to know your test results and keep a list of the medicines you take. How can you care for yourself at home? · Do not eat too much sugar, fat, or fast foods. You can still have dessert and treats now and then. The goal is moderation. · Start small to improve your eating habits. Pay attention to portion sizes, drink less juice and soda pop, and eat more fruits and vegetables. ? Eat a healthy amount of food. A 3-ounce serving of meat, for example, is about the size of a deck of cards. Fill the rest of your plate with vegetables and whole grains. ? Limit the amount of soda and sports drinks you have every day. Drink more water when you are thirsty. ? Eat at least 5 servings of fruits and vegetables every day. It may seem like a lot, but it is not hard to reach this goal. A serving or helping is 1 piece of fruit, 1 cup of vegetables, or 2 cups of leafy, raw vegetables. Have an apple or some carrot sticks as an afternoon snack instead of a candy bar. Try to have fruits and/or vegetables at every meal. 
· Make exercise part of your daily routine. You may want to start with simple activities, such as walking, bicycling, or slow swimming. Try to be active 30 to 60 minutes every day.  You do not need to do all 30 to 60 minutes all at once. For example, you can exercise 3 times a day for 10 or 20 minutes. Moderate exercise is safe for most people, but it is always a good idea to talk to your doctor before starting an exercise program. 
· Keep moving. Heather Fong the lawn, work in the garden, or reQwip. Take the stairs instead of the elevator at work. · If you smoke, quit. People who smoke have an increased risk for heart attack, stroke, cancer, and other lung illnesses. Quitting is hard, but there are ways to boost your chance of quitting tobacco for good. ? Use nicotine gum, patches, or lozenges. ? Ask your doctor about stop-smoking programs and medicines. ? Keep trying. In addition to reducing your risk of diseases in the future, you will notice some benefits soon after you stop using tobacco. If you have shortness of breath or asthma symptoms, they will likely get better within a few weeks after you quit. · Limit how much alcohol you drink. Moderate amounts of alcohol (up to 2 drinks a day for men, 1 drink a day for women) are okay. But drinking too much can lead to liver problems, high blood pressure, and other health problems. Family health If you have a family, there are many things you can do together to improve your health. · Eat meals together as a family as often as possible. · Eat healthy foods. This includes fruits, vegetables, lean meats and dairy, and whole grains. · Include your family in your fitness plan. Most people think of activities such as jogging or tennis as the way to fitness, but there are many ways you and your family can be more active. Anything that makes you breathe hard and gets your heart pumping is exercise. Here are some tips: 
? Walk to do errands or to take your child to school or the bus. 
? Go for a family bike ride after dinner instead of watching TV. Where can you learn more? Go to http://christine-beverly.info/. Enter U335 in the search box to learn more about \"A Healthy Lifestyle: Care Instructions. \" Current as of: September 11, 2018 Content Version: 11.9 © 9171-5497 [employer]. Care instructions adapted under license by [employer] (which disclaims liability or warranty for this information). If you have questions about a medical condition or this instruction, always ask your healthcare professional. [address] any warranty or liability for your use of this information. Kidney Infection: Care Instructions Your Care Instructions A kidney infection (pyelonephritis) is a type of urinary tract infection, or UTI. Most UTIs are bladder infections. Kidney infections tend to make people much sicker than bladder infections do. A kidney infection is also more serious because it can cause lasting damage if it is not treated quickly. Follow-up care is a key part of your treatment and safety. Be sure to make and go to all appointments, and call your doctor if you are having problems. It's also a good idea to know your test results and keep a list of the medicines you take. How can you care for yourself at home? · Take your antibiotics as directed. Do not stop taking them just because you feel better. You need to take the full course of antibiotics. · Drink plenty of water, enough so that your urine is light yellow or clear like water. This may help wash out bacteria that are causing the infection. If you have kidney, heart, or liver disease and have to limit fluids, talk with your doctor before you increase the amount of fluids you drink. · Urinate often. Try to empty your bladder each time. · To relieve pain, take a hot shower or lay a heating pad (set on low) over your lower belly. Never go to sleep with a heating pad in place. Put a thin cloth between the heating pad and your skin. To help prevent kidney infections · Drink plenty of water each day. This helps you urinate often, which clears bacteria from your system. If you have kidney, heart, or liver disease and have to limit fluids, talk with your doctor before you increase the amount of fluids you drink. · Urinate when you have the urge. Do not hold your urine for a long time. Urinate before you go to sleep. · If you have symptoms of a bladder infection, such as burning when you urinate or having to urinate often, call your doctor so you can treat the problem before it gets worse. If you do not treat a bladder infection quickly, it can spread to the kidney. · Men should keep the tip of the penis clean. If you are a woman, keep these ideas in mind: · Urinate right after you have sex. · Change sanitary pads often. Avoid douches, feminine hygiene sprays, and other feminine hygiene products that have deodorants. · After going to the bathroom, wipe from front to back. When should you call for help? Call your doctor now or seek immediate medical care if: 
  · You have symptoms that a kidney infection is getting worse. These may include: 
? Pain or burning when you urinate. ? A frequent need to urinate without being able to pass much urine. ? Pain in the flank, which is just below the rib cage and above the waist on either side of the back. ? Blood in the urine. ? A fever.  
  · You are vomiting or nauseated.  
 Watch closely for changes in your health, and be sure to contact your doctor if: 
  · You do not get better as expected. Where can you learn more? Go to http://christine-beverly.info/. Enter E927 in the search box to learn more about \"Kidney Infection: Care Instructions. \" Current as of: March 14, 2018 Content Version: 11.9 © 4223-9221 [employer]. Care instructions adapted under license by [employer] (which disclaims liability or warranty for this information).  If you have questions about a medical condition or this instruction, always ask your healthcare professional. [place] [address] any warranty or liability for your use of this information.

## 2019-07-16 NOTE — PROGRESS NOTES
1. Have you been to the ER, urgent care clinic since your last visit? Hospitalized since your last visit? yes    2. Have you seen or consulted any other health care providers outside of the [address] [place] [address] since your last visit? Include any pap smears or colon screening. yes  Reviewed record in preparation for visit and have obtained necessary documentation. Patient did not bring medications to visit for review. Information provided on Advanced Directive, Living Will. Health Maintenance Due   Topic Date Due    Pneumococcal 0-64 years (1 of 3 - PCV13) 07/03/1971    COLONOSCOPY  07/03/1983    DTaP/Tdap/Td series (1 - Tdap) 07/03/1986    Shingrix Vaccine Age 50> (1 of 2) 07/03/2015    MEDICARE YEARLY EXAM  03/14/2018     - check for functional glucose monitor and record keeping system  Pt was given BS record log to document home readings and return to office for review  Diabetic Bundle:  LDL-92  A1C-8.0  BP-153/92  Smoking? no  Anticoagulation medication? Yes  Eye exam dilated?   Foot exam?

## 2019-07-16 NOTE — PROGRESS NOTES
HPI  Pt was called by nurse navigator on 7/5/19, but was unable to reach the patient. He had a nurse come to the house to check on his right leg that he had BKA on and he had a temp of 103F. He was sent to [facility] where they found him to be septic 2/2 pyelonephritis. He was in the hospital from June 28th to July 8th. He was discharged with one antibiotic (ceftriaxone) that he is putting through his dialysis port through 7/21. He was also noted to have klebsiella bacteremia and the surveillance culture was negative. He is no longer having symptoms. Prior to hospitalization, he was having some right flank pain. He is not having pain there any more. He was not having dysuria or hematuria prior to coming in. He was having SOB while in the hospital as well. They put him on oxygen. Records report that he was having acute hypoxic respiratory 2/2 pulmonary edema caused by fluid resuscitation. He did not require lasix, and O2 was weaned prior to discharge. There is no note of echocardiogram.    Prior to admission, he was on Lantus 75 units daily, and he was discharged on 50 units because he was well controlled with the reduced dose. Pt reports that all of his blood glucose measurements at home have been under 200. He had one value under 100, but remained asymptomatic. He takes his glucose twice daily normally. His creatinine was 5.24 upon admission, which improved to 3.60 upon discharge. His baseline was reported at 2.3. He has history of right kidney transplant. They performed a kidney ultrasound on 6/29 that showed elevated resistive indices, increased in the interval. He states that he is following up with his nephrologist tomorrow. He has a follow up appointment with the transplant clinic on 7/23. He had his right BKA performed on June 4, 2019. He has had not problems with this. He has home health set up with his surgeon.  He states he is getting the surgical site checked tomorrow, and the sutures will be removed. Allergies:   No Known Allergies    Meds:   Current Outpatient Medications   Medication Sig Dispense Refill    cefTRIAXone (ROCEPHIN) 2 gram solr 2 g by IntraVENous route every twenty-four (24) hours.  heparin, porcine, in ns (HEPARIN FLUSH) 10 unit/mL kit by IntraVENous route.  tamsulosin (FLOMAX) 0.4 mg capsule Take 0.4 mg by mouth daily.  cholecalciferol, vitamin D3, (VITAMIN D3) 2,000 unit tab Take 4,000 Int'l Units by mouth daily.  insulin glargine (LANTUS U-100 INSULIN) 100 unit/mL injection INJECT 39 UNITS UNDER THE SKIN TWICE A DAY (Patient taking differently: 50 Units by SubCUTAneous route daily. INJECT 39 UNITS UNDER THE SKIN TWICE A DAY) 2 Vial 3    NULOJIX 250 mg injection BELATACEPT 525 MG / 100 ML NS IV OVER 30 MINUTES EXP:  0    hydrALAZINE (APRESOLINE) 100 mg tablet Take 1 Tab by mouth three (3) times daily.  pravastatin (PRAVACHOL) 40 mg tablet Take 1 Tab by mouth nightly.  allopurinol (ZYLOPRIM) 100 mg tablet Take 1 Tab by mouth daily. 90 Tab 3    amLODIPine (NORVASC) 10 mg tablet Take 1 Tab by mouth daily. 90 Tab 3    mycophenolate (CELLCEPT) 500 mg tablet Take 2 Tabs by mouth two (2) times a day. 180 Tab 3    metoprolol (LOPRESSOR) 100 mg tablet Take 100 mg by mouth two (2) times a day.  predniSONE (DELTASONE) 5 mg tablet Take 5 mg by mouth daily.  Dexlansoprazole (DEXILANT) 60 mg CpDM Take 30 mg by mouth daily (with breakfast).  aspirin 81 mg tablet Take  by mouth.  insulin aspart U-100 (NOVOLOG) 100 unit/mL inpn 10 Units by SubCUTAneous route daily. 10 units 9am 14units 12pm 10 units 5pm 15 mL 1    compr.stocking,knee,long,large (COMP STOCKING,KNEE,LONG,LARGE) misc Wear on the right leg for swelling. Goal 20-40 mmhg of pressure.  1 Package 1       PMH:  Past Medical History:   Diagnosis Date    CAD (coronary artery disease)     Status post non-Q wave MI May 2010 with cardiac catheterization and a subsequent CABG with TOUSSAINT to LAD and sequential saphenous vein graft to posterolateral branch and PDA by Dr. Austen Otero.  Chronic kidney disease     Controlled type 1 diabetes mellitus with stage 5 chronic kidney disease not on chronic dialysis ([facility] [address]     Deaf     Essential hypertension     Hyperlipidemia     [facility]     Steroids, Transplant    Kidney replaced by transplant        SH:  Smoker:  Social History     Tobacco Use   Smoking Status Passive Smoke Exposure - Never Smoker   Smokeless Tobacco Never Used       ETOH:   Social History     Substance and Sexual Activity   Alcohol Use Yes    Alcohol/week: 2.5 oz    Types: 5 Cans of beer per week       FH:   Family History   Problem Relation Age of Onset    Cancer Mother     Diabetes Mother     Cancer Father     Heart Disease Other        ROS: Positive for Items in bold:   Constitutional: No headache, fever, fatigue, weight loss or weight gain      Eyes: No redness, pruritis, pain, visual changes  Ears: No ear pain, loss or changes in hearing     Nose: No congestion, rhinorrhea  Oropharynx: No sore throat, lesions in throat  Cardiac: No chest pain, palpitations  Resp: No cough or shortness of breath      GI: No nausea, vomiting, constipation, diarrhea, blood in stool  : No frequency, urgency, dysuria, hematuria, or flank pain  Neuro:No dizziness, headaches  Psych: No anxiety, depression  Skin: No rashes    Physical Exam:  Visit Vitals  BP (!) 153/92 (BP 1 Location: Right arm, BP Patient Position: Sitting)   Pulse 74   Temp 97.5 °F (36.4 °C) (Oral)   Resp 16   SpO2 97%       Gen: No apparent distress. Alert and oriented and responds to all questions appropriately. Eyes: Conjunctiva clear, extraocular movements are intact. Ear: External ears are normal. Hearing grossly normal b/l. Nose: Turbinates are within normal limits. No drainage. Oropharynx: No oral lesions or exudates.   Neck: Supple; no masses  Lungs: Respirations unlabored; clear to auscultation bilaterally. Good air movement. Cardio: Regular, rate,  without murmurs, gallops or rubs. Pulses 2+. Abdomen: Soft; nontender; nondistended; normoactive bowel sounds. No CVA tenderness. Neurologic: No focal neurologic deficits. Coordination intact. Ext: Well perfused. No edema. BKA of right leg. No erythema. Appears well-healing. Skin: No obvious rashes. Lab Results   Component Value Date/Time    Sodium 142 04/16/2019 02:21 PM    Potassium 4.6 04/16/2019 02:21 PM    Chloride 107 (H) 04/16/2019 02:21 PM    CO2 20 04/16/2019 02:21 PM    Anion gap 8 12/08/2015 11:50 PM    Glucose 136 (H) 04/16/2019 02:21 PM    BUN 27 (H) 04/16/2019 02:21 PM    Creatinine 2.22 (H) 04/16/2019 02:21 PM    BUN/Creatinine ratio 12 04/16/2019 02:21 PM    GFR est AA 38 (L) 04/16/2019 02:21 PM    GFR est non-AA 33 (L) 04/16/2019 02:21 PM    Calcium 9.9 04/16/2019 02:21 PM     Lab Results   Component Value Date/Time    Cholesterol, total 186 04/16/2019 02:21 PM    HDL Cholesterol 26 (L) 04/16/2019 02:21 PM    LDL,Direct 80 05/07/2010 04:15 AM    LDL, calculated 92 04/16/2019 02:21 PM    VLDL, calculated 68 (H) 04/16/2019 02:21 PM    Triglyceride 339 (H) 04/16/2019 02:21 PM    CHOL/HDL Ratio 9.7 (H) 05/07/2010 04:15 AM     I have personally reviewed the labs results      Assessment and Plan:   Sandeep Gomez is a 47 y.o. male who presents for transitional care for sepsis 2/2 R pyelonephritis and klebsiella bacteremia. Hospital course complicated by AHRF 2/2 pulmonary edema, resolved prior to discharge. ICD-10-CM ICD-9-CM    1. Pyelonephritis N12 590.80 CBC WITH AUTOMATED DIFF      METABOLIC PANEL, BASIC      CBC WITH AUTOMATED DIFF   2. Bacteremia R78.81 790.7    3. Acute pulmonary edema (HCC) J81.0 518.4    4. Stage 3 chronic kidney disease (HCC) N18.3 585.3    5. Renal transplant recipient Z94.0 V42.0    6. Hx of right BKA ([facility] [address] Z89.511 V49.75        1.  Pyelonephritis - septic upon presentation at hospital. Experienced right-sided flank pain prior to hospitalization. No longer symptomatic. Using dialysis port for IV abx. Discharged with IV ceftriaxone to take through 7/21. Will check on kidney function.  - CBC WITH AUTOMATED DIFF; Future  - METABOLIC PANEL, BASIC    2. Bacteremia -  Pt with reported klebsiella bacteremia. Surveillance culture negative. - Continue IV ceftriazone through 7/21    3. Acute pulmonary edema (HCC) - was on O2 in hospital, weaned off prior to discharge. Pulmonary edema noted on CXR. Did not require diuresis. 4. Stage 3 chronic kidney disease ([facility] [address] - in setting of right renal transplant. Baseline 2.3. Discharged with creatinine of 3.60. Will check again today. Anemia in setting of chronic renal disease.  - BMP to check creatinine  - Will check CBC  - Follow up with nephrology tomorrow    5. Renal transplant recipient - renal US in hospital with elevated resistive indices. - Appointment with renal transplant clinic on 7/23    6. Hx of right BKA [facility]) - performed on June 4th, within time since last visit at this clinic. Surgeon ordered home health. - Appointment tomorrow to get wound check and remove sutures    Discussed diagnoses in detail with patient. Patient expressed understanding of and agreement to above plan. All questions and concerns addressed. Medication risks/benefits/side effects discussed with patient. Patient is counseled to return to the office and/or ED if symptoms do not improve as expected. Patient discussed with Dr. Ellyn Gaviria, Attending Physician.     Phylicia Hussein MD  07/16/19    Family Medicine Resident

## 2019-07-16 NOTE — PROGRESS NOTES
I saw and evaluated the patient, performing the key elements of the service. I discussed the findings, assessment and plan with the resident and agree with the resident's findings and plan as documented in the resident's note. Discharge records personally reviewed with resident including pertinent labs and imaging, and summarized below.

## 2019-07-17 LAB
BASOPHILS # BLD AUTO: 0.1 X10E3/UL (ref 0–0.2)
BASOPHILS NFR BLD AUTO: 1 %
BUN SERPL-MCNC: 48 MG/DL (ref 6–24)
BUN/CREAT SERPL: 15 (ref 9–20)
CALCIUM SERPL-MCNC: 9.6 MG/DL (ref 8.7–10.2)
CHLORIDE SERPL-SCNC: 105 MMOL/L (ref 96–106)
CO2 SERPL-SCNC: 19 MMOL/L (ref 20–29)
CREAT SERPL-MCNC: 3.23 MG/DL (ref 0.76–1.27)
EOSINOPHIL # BLD AUTO: 0.1 X10E3/UL (ref 0–0.4)
EOSINOPHIL NFR BLD AUTO: 1 %
ERYTHROCYTE [DISTWIDTH] IN BLOOD BY AUTOMATED COUNT: 16.3 % (ref 12.3–15.4)
GLUCOSE SERPL-MCNC: 169 MG/DL (ref 65–99)
HCT VFR BLD AUTO: 33.9 % (ref 37.5–51)
HGB BLD-MCNC: 10.6 G/DL (ref 13–17.7)
IMM GRANULOCYTES # BLD AUTO: 0 X10E3/UL (ref 0–0.1)
IMM GRANULOCYTES NFR BLD AUTO: 0 %
LYMPHOCYTES # BLD AUTO: 1.3 X10E3/UL (ref 0.7–3.1)
LYMPHOCYTES NFR BLD AUTO: 14 %
MCH RBC QN AUTO: 26.4 PG (ref 26.6–33)
MCHC RBC AUTO-ENTMCNC: 31.3 G/DL (ref 31.5–35.7)
MCV RBC AUTO: 84 FL (ref 79–97)
MONOCYTES # BLD AUTO: 0.3 X10E3/UL (ref 0.1–0.9)
MONOCYTES NFR BLD AUTO: 4 %
NEUTROPHILS # BLD AUTO: 7.5 X10E3/UL (ref 1.4–7)
NEUTROPHILS NFR BLD AUTO: 80 %
PLATELET # BLD AUTO: 279 X10E3/UL (ref 150–450)
POTASSIUM SERPL-SCNC: 5.4 MMOL/L (ref 3.5–5.2)
RBC # BLD AUTO: 4.02 X10E6/UL (ref 4.14–5.8)
SODIUM SERPL-SCNC: 140 MMOL/L (ref 134–144)
WBC # BLD AUTO: 9.3 X10E3/UL (ref 3.4–10.8)

## 2019-07-18 RX ORDER — INSULIN GLARGINE 100 [IU]/ML
50 INJECTION, SOLUTION SUBCUTANEOUS DAILY
Qty: 1 VIAL | Refills: 0
Start: 2019-07-18 | End: 2020-02-24

## 2019-07-26 ENCOUNTER — TELEPHONE (OUTPATIENT)
Dept: FAMILY MEDICINE CLINIC | Age: 54
End: 2019-07-26

## 2019-07-29 NOTE — PROGRESS NOTES
Pt with creatinine of 3.23, which has improved from discharge creatinine of 3.6. However, this is still higher than his baseline of around 2.3 previously. Hgb improved from discharge as well. Value was 9.8 on 7/8 and 10.6 on 7/16. Pt noted to have baseline of 11. Normocytic anemia. Will discuss results at follow up appointment tomorrow.     Evangelista Limon MD  Family Medicine Resident

## 2019-07-30 ENCOUNTER — OFFICE VISIT (OUTPATIENT)
Dept: FAMILY MEDICINE CLINIC | Age: 54
End: 2019-07-30

## 2019-07-30 VITALS
HEART RATE: 75 BPM | SYSTOLIC BLOOD PRESSURE: 151 MMHG | RESPIRATION RATE: 20 BRPM | OXYGEN SATURATION: 98 % | TEMPERATURE: 97.6 F | DIASTOLIC BLOOD PRESSURE: 87 MMHG

## 2019-07-30 DIAGNOSIS — N18.30 STAGE 3 CHRONIC KIDNEY DISEASE (HCC): Primary | ICD-10-CM

## 2019-07-30 DIAGNOSIS — Z89.511 HX OF RIGHT BKA (HCC): ICD-10-CM

## 2019-07-30 DIAGNOSIS — D64.9 ANEMIA, UNSPECIFIED TYPE: ICD-10-CM

## 2019-07-30 DIAGNOSIS — Z94.0 KIDNEY TRANSPLANT RECIPIENT: ICD-10-CM

## 2019-07-30 DIAGNOSIS — E10.65 HYPERGLYCEMIA DUE TO TYPE 1 DIABETES MELLITUS (HCC): ICD-10-CM

## 2019-07-30 DIAGNOSIS — I10 ESSENTIAL HYPERTENSION: ICD-10-CM

## 2019-07-30 RX ORDER — MYCOPHENOLATE MOFETIL 500 MG/1
1000 TABLET ORAL 2 TIMES DAILY
Qty: 120 TAB | Refills: 0 | Status: SHIPPED | OUTPATIENT
Start: 2019-07-30 | End: 2019-08-01 | Stop reason: SDUPTHER

## 2019-07-30 NOTE — PROGRESS NOTES
[facility]    Subjective:   Crystal Valadez is a 47 y.o. male with history of CAD, HTN, DM, CKD3, HLD, renal transplant  CC: needs refill  History provided by patient and wife    HPI:  He is doing well. No complaints today. His blood pressure is usually 80 diastolic. His BPs were as high as 150s, but typically in the 130s. He does not go to dialysis. He needs a refill of his cellcept. The pharmacy called the doctor to prescribe it. He saw his transplant doctor about 2 weeks ago. He states that they gave him medication to help with kidney numbers, but he left it at home. It is a new prescription. He saw his nephrologist about 2 weeks ago as well. He states he has been drinking a lot of water to improve his \"kidney number. \"    They removed the sutures about a week ago from his R BKA. Home health has been saying that he is doing well. His blood sugars have all been between 120-160. He has not had any hypoglycemic symptoms. He denies CP, SOB, blurry vision, tingling in feet. PFSH:     Current Outpatient Medications on File Prior to Visit   Medication Sig Dispense Refill    insulin glargine (LANTUS U-100 INSULIN) 100 unit/mL injection 50 Units by SubCUTAneous route daily. 1 Vial 0    tamsulosin (FLOMAX) 0.4 mg capsule Take 0.4 mg by mouth daily.  cholecalciferol, vitamin D3, (VITAMIN D3) 2,000 unit tab Take 4,000 Int'l Units by mouth daily.  insulin aspart U-100 (NOVOLOG) 100 unit/mL inpn 10 Units by SubCUTAneous route daily. 10 units 9am 14units 12pm 10 units 5pm 15 mL 1    NULOJIX 250 mg injection BELATACEPT 525 MG / 100 ML NS IV OVER 30 MINUTES EXP:  0    hydrALAZINE (APRESOLINE) 100 mg tablet Take 1 Tab by mouth three (3) times daily.  pravastatin (PRAVACHOL) 40 mg tablet Take 1 Tab by mouth nightly.  allopurinol (ZYLOPRIM) 100 mg tablet Take 1 Tab by mouth daily.  90 Tab 3    metoprolol (LOPRESSOR) 100 mg tablet Take 100 mg by mouth two (2) times a day.      predniSONE (DELTASONE) 5 mg tablet Take 5 mg by mouth daily.  Dexlansoprazole (DEXILANT) 60 mg CpDM Take 30 mg by mouth daily (with breakfast).  aspirin 81 mg tablet Take  by mouth.  cefTRIAXone (ROCEPHIN) 2 gram solr 2 g by IntraVENous route every twenty-four (24) hours.  heparin, porcine, in ns (HEPARIN FLUSH) 10 unit/mL kit by IntraVENous route.  amLODIPine (NORVASC) 10 mg tablet Take 1 Tab by mouth daily. 90 Tab 3    compr.stocking,knee,long,large (COMP STOCKING,KNEE,LONG,LARGE) misc Wear on the right leg for swelling. Goal 20-40 mmhg of pressure. 1 Package 1     No current facility-administered medications on file prior to visit. Patient Active Problem List   Diagnosis Code    Coronary artery disease I25.10    Other and unspecified hyperlipidemia E78.5    Deafness H91.90    Type 1 diabetes mellitus with stage 5 chronic kidney disease not on chronic dialysis (HCC) E10.22, N18.5    CKD (chronic kidney disease), stage IV (HCC) N18.4    HTN (hypertension) I10    Kidney replaced by transplant Z94.0    Immunocompromised ([facility] [address] D84.9    [facility] Z89.511       Social History     Socioeconomic History    Marital status: SINGLE     Spouse name: Not on file    Number of children: Not on file    Years of education: Not on file    Highest education level: Not on file   Occupational History    Not on file   Social Needs    Financial resource strain: Not on file    Food insecurity:     Worry: Not on file     Inability: Not on file    Transportation needs:     Medical: Not on file     Non-medical: Not on file   Tobacco Use    Smoking status: Passive Smoke Exposure - Never Smoker    Smokeless tobacco: Never Used   Substance and Sexual Activity    Alcohol use:  Yes     Alcohol/week: 4.2 standard drinks     Types: 5 Cans of beer per week    Drug use: No    Sexual activity: Not Currently     Partners: Female   Lifestyle    Physical activity:     Days per week: Not on file     Minutes per session: Not on file    Stress: Not on file   Relationships    Social connections:     Talks on phone: Not on file     Gets together: Not on file     Attends Religion service: Not on file     Active member of club or organization: Not on file     Attends meetings of clubs or organizations: Not on file     Relationship status: Not on file    Intimate partner violence:     Fear of current or ex partner: Not on file     Emotionally abused: Not on file     Physically abused: Not on file     Forced sexual activity: Not on file   Other Topics Concern    Not on file   Social History Narrative    Not on file       Review of Systems   Constitutional: Negative for chills and fever. HENT: Negative for congestion and sore throat. Eyes: Negative for blurred vision and pain. Respiratory: Negative for cough, sputum production, shortness of breath and wheezing. Cardiovascular: Negative for chest pain, palpitations and leg swelling. Gastrointestinal: Negative for abdominal pain, constipation, diarrhea, nausea and vomiting. Genitourinary: Negative for dysuria, flank pain, frequency, hematuria and urgency. Musculoskeletal: Negative for joint pain and myalgias. Skin: Negative for itching and rash. Neurological: Negative for dizziness and headaches. Objective:     Visit Vitals  /87   Pulse 75   Temp 97.6 °F (36.4 °C) (Oral)   Resp 20   SpO2 98%          Physical Exam   Constitutional: He is oriented to person, place, and time. He appears well-developed and well-nourished. No distress. HENT:   Head: Normocephalic and atraumatic. Nose: Nose normal.   Mouth/Throat: Oropharynx is clear and moist.   Eyes: Pupils are equal, round, and reactive to light. Right eye exhibits no discharge. Left eye exhibits no discharge. Neck: Normal range of motion. Neck supple. No thyromegaly present.    Cardiovascular: Normal rate, regular rhythm, normal heart sounds and intact distal pulses. No murmur heard. Pulmonary/Chest: Effort normal and breath sounds normal. No respiratory distress. He has no wheezes. He has no rales. Abdominal: Soft. Bowel sounds are normal. He exhibits no distension. There is no tenderness. Musculoskeletal: Normal range of motion. He exhibits no edema. Right BKA present   Lymphadenopathy:     He has no cervical adenopathy. Neurological: He is alert and oriented to person, place, and time. Skin: Skin is warm and dry. No rash noted. He is not diaphoretic. No erythema. Pertinent Labs/Studies: none today      Assessment and orders:       ICD-10-CM ICD-9-CM    1. Stage 3 chronic kidney disease (HCC) N18.3 585.3    2. Kidney transplant recipient Z94.0 V42.0 mycophenolate (CELLCEPT) 500 mg tablet   3. Essential hypertension I10 401.9    4. Hx of right BKA ([facility] [address] Z89.511 V49.75    5. Hyperglycemia due to type 1 diabetes mellitus ([facility] [address] E10.65 250.01    6. Anemia, unspecified type D64.9 285.9      Encounter Diagnoses   Name Primary?  Stage 3 chronic kidney disease ([facility] [address] Yes    Kidney transplant recipient     Essential hypertension     Hx of right BKA ([facility] [address]     Hyperglycemia due to type 1 diabetes mellitus ([facility] [address]     Anemia, unspecified type      Diagnoses and all orders for this visit:    1. Stage 3 chronic kidney disease ([facility] [address] - Creatinine was 3.23 on labs drawn on 7/16 with GFR of 24. Improved from discharge. - Followed by nephrologist, seen 2 weeks ago  - Pt reports he was given a new medication for his kidneys that is an injection, but doesn't remember the name    2. Kidney transplant recipient  - follows with transplant physician.  -     mycophenolate (CELLCEPT) 500 mg tablet; Take 2 Tabs by mouth two (2) times a day for 30 days. Indications: Prevent Kidney Transplant Rejection  -     Will refill one month of this medication because pt reports that he has one dose left.   -     Pt has sent request to transplant clinic for the longterm refills    3. Essential hypertension - Blood pressure 151/87 today. Readings at home mostly in 130s/80s at home taken by home health nurse. Will not make adjustments at time. -     Continue amlodipine, metoprolol, hydrlazine      4. Hx of right BKA ([facility] [address] - Home health nurse still visiting house. Sutures removed last week. Healing well. 5. Hyperglycemia due to type 1 diabetes mellitus ([address] - Home blood glucose measuring between 120-160. Good control. No symptoms of hypo or hyperglycemia.        -     Continue Lantus 50 U daily, novolog    6. Anemia, unspecified type - Pt with hgb of 10.6 on 7/16. Baseline ~11, reported in hospital. While admitted between 8-9. Improved from discharge. Follow-up and Dispositions    · Return in about 1 month (around 8/27/2019). I have discussed the diagnosis with the patient and the intended plan as seen in the above orders. Social history, medical history, and labs were reviewed. The patient has received an after-visit summary and questions were answered concerning future plans. I have discussed medication side effects and warnings with the patient as well.     Alvina Hutchins MD  Resident [facility]  07/31/19

## 2019-07-30 NOTE — PROGRESS NOTES
1. Have you been to the ER, urgent care clinic since your last visit? Hospitalized since your last visit? no    2. Have you seen or consulted any other health care providers outside of the [address] [place] [address] since your last visit? Include any pap smears or colon screening. No  Reviewed record in preparation for visit and have obtained necessary documentation. Patient did not bring medications to visit for review. Information provided on Advanced Directive, Living Will.   Health Maintenance Due   Topic Date Due    Pneumococcal 0-64 years (1 of 3 - PCV13) 07/03/1971    COLONOSCOPY  07/03/1983    DTaP/Tdap/Td series (1 - Tdap) 07/03/1986    Shingrix Vaccine Age 50> (1 of 2) 07/03/2015    MEDICARE YEARLY EXAM  03/14/2018

## 2019-07-30 NOTE — PATIENT INSTRUCTIONS
A Healthy Lifestyle: Care Instructions  Your Care Instructions    A healthy lifestyle can help you feel good, stay at a healthy weight, and have plenty of energy for both work and play. A healthy lifestyle is something you can share with your whole family. A healthy lifestyle also can lower your risk for serious health problems, such as high blood pressure, heart disease, and diabetes. You can follow a few steps listed below to improve your health and the health of your family. Follow-up care is a key part of your treatment and safety. Be sure to make and go to all appointments, and call your doctor if you are having problems. It's also a good idea to know your test results and keep a list of the medicines you take. How can you care for yourself at home? · Do not eat too much sugar, fat, or fast foods. You can still have dessert and treats now and then. The goal is moderation. · Start small to improve your eating habits. Pay attention to portion sizes, drink less juice and soda pop, and eat more fruits and vegetables. ? Eat a healthy amount of food. A 3-ounce serving of meat, for example, is about the size of a deck of cards. Fill the rest of your plate with vegetables and whole grains. ? Limit the amount of soda and sports drinks you have every day. Drink more water when you are thirsty. ? Eat at least 5 servings of fruits and vegetables every day. It may seem like a lot, but it is not hard to reach this goal. A serving or helping is 1 piece of fruit, 1 cup of vegetables, or 2 cups of leafy, raw vegetables. Have an apple or some carrot sticks as an afternoon snack instead of a candy bar. Try to have fruits and/or vegetables at every meal.  · Make exercise part of your daily routine. You may want to start with simple activities, such as walking, bicycling, or slow swimming. Try to be active 30 to 60 minutes every day. You do not need to do all 30 to 60 minutes all at once.  For example, you can exercise 3 times a day for 10 or 20 minutes. Moderate exercise is safe for most people, but it is always a good idea to talk to your doctor before starting an exercise program.  · Keep moving. Rosio Leather the lawn, work in the garden, or [employer]. Take the stairs instead of the elevator at work. · If you smoke, quit. People who smoke have an increased risk for heart attack, stroke, cancer, and other lung illnesses. Quitting is hard, but there are ways to boost your chance of quitting tobacco for good. ? Use nicotine gum, patches, or lozenges. ? Ask your doctor about stop-smoking programs and medicines. ? Keep trying. In addition to reducing your risk of diseases in the future, you will notice some benefits soon after you stop using tobacco. If you have shortness of breath or asthma symptoms, they will likely get better within a few weeks after you quit. · Limit how much alcohol you drink. Moderate amounts of alcohol (up to 2 drinks a day for men, 1 drink a day for women) are okay. But drinking too much can lead to liver problems, high blood pressure, and other health problems. Family health  If you have a family, there are many things you can do together to improve your health. · Eat meals together as a family as often as possible. · Eat healthy foods. This includes fruits, vegetables, lean meats and dairy, and whole grains. · Include your family in your fitness plan. Most people think of activities such as jogging or tennis as the way to fitness, but there are many ways you and your family can be more active. Anything that makes you breathe hard and gets your heart pumping is exercise. Here are some tips:  ? Walk to do errands or to take your child to school or the bus.  ? Go for a family bike ride after dinner instead of watching TV. Where can you learn more? Go to http://christine-beverly.info/. Enter O354 in the search box to learn more about \"A Healthy Lifestyle: Care Instructions. \"  Current as of: September 11, 2018  Content Version: 12.1  © 0842-0494 [employer], [employer]. Care instructions adapted under license by [employer] (which disclaims liability or warranty for this information). If you have questions about a medical condition or this instruction, always ask your healthcare professional. [address] any warranty or liability for your use of this information.

## 2019-08-01 ENCOUNTER — TELEPHONE (OUTPATIENT)
Dept: FAMILY MEDICINE CLINIC | Age: 54
End: 2019-08-01

## 2019-08-01 DIAGNOSIS — Z94.0 KIDNEY TRANSPLANT RECIPIENT: ICD-10-CM

## 2019-08-01 RX ORDER — MYCOPHENOLATE MOFETIL 500 MG/1
1000 TABLET ORAL 2 TIMES DAILY
Qty: 120 TAB | Refills: 0 | Status: SHIPPED | OUTPATIENT
Start: 2019-08-01 | End: 2019-08-31

## 2019-08-09 ENCOUNTER — TELEPHONE (OUTPATIENT)
Dept: FAMILY MEDICINE CLINIC | Age: 54
End: 2019-08-09

## 2019-08-28 NOTE — TELEPHONE ENCOUNTER
Caller's first/last name:  Chaim Zuñiga    Name and dosage of medication:  Metoprolol     Is patient out of medication? YES    Name of Pharmacy:  [id number] [facility] contact number:  [phone]    Further clarification of call:      Patient is out of medication and has been since Monday. BP is running 150/70 and pulse is 95 - 100.   [facility] sent the request to his doctor in the hospital.

## 2019-08-29 RX ORDER — METOPROLOL TARTRATE 100 MG/1
100 TABLET ORAL 2 TIMES DAILY
Qty: 60 TAB | Refills: 6 | Status: SHIPPED | OUTPATIENT
Start: 2019-08-29 | End: 2021-12-20

## 2019-11-07 RX ORDER — HYDRALAZINE HYDROCHLORIDE 100 MG/1
100 TABLET, FILM COATED ORAL 3 TIMES DAILY
Qty: 90 TAB | Refills: 1 | Status: SHIPPED | OUTPATIENT
Start: 2019-11-07 | End: 2020-03-02

## 2019-11-07 NOTE — TELEPHONE ENCOUNTER
Caller's first/last name:  [employer]    Name and dosage of medication:  Hydralazine 50 mg    Best contact number:  [phone]    Further clarification of call:      Refill request received from [employer]

## 2019-11-21 DIAGNOSIS — E10.22 TYPE 1 DIABETES MELLITUS WITH STAGE 5 CHRONIC KIDNEY DISEASE NOT ON CHRONIC DIALYSIS (HCC): ICD-10-CM

## 2019-11-21 DIAGNOSIS — N18.5 TYPE 1 DIABETES MELLITUS WITH STAGE 5 CHRONIC KIDNEY DISEASE NOT ON CHRONIC DIALYSIS (HCC): ICD-10-CM

## 2019-11-22 RX ORDER — INSULIN ASPART 100 [IU]/ML
INJECTION, SOLUTION INTRAVENOUS; SUBCUTANEOUS
Qty: 15 PEN | Refills: 1 | Status: SHIPPED | OUTPATIENT
Start: 2019-11-22 | End: 2020-05-18

## 2020-02-27 RX ORDER — MYCOPHENOLATE MOFETIL 500 MG/1
500 TABLET ORAL 2 TIMES DAILY
COMMUNITY

## 2020-02-28 RX ORDER — MYCOPHENOLATE MOFETIL 500 MG/1
1000 TABLET ORAL 2 TIMES DAILY
Qty: 120 TAB | Refills: 3 | OUTPATIENT
Start: 2020-02-28

## 2020-02-28 NOTE — TELEPHONE ENCOUNTER
Called [facility] and informed them per Dr. Cyndi Romano, the refill request for patient's cellcept needs to go to his transplant doctor.

## 2020-03-22 NOTE — PROGRESS NOTES
I saw and evaluated the patient, performing the key elements of the service. I discussed the findings, assessment and plan with the resident and agree with the resident's findings and plan as documented in the resident's note. Subjective: The patient is awake and alert. Complains of generalized fatigue. Has had diarrhea with being on antibiotics. Denies chest pain, angina, dyspnea or abdominal discomfort. No nausea or vomiting. Tolerating diet. Objective:    BP (!) 153/88   Pulse 101   Temp 97.4 °F (36.3 °C) (Temporal)   Resp 18   Ht 5' 10\" (1.778 m)   Wt 172 lb (78 kg)   SpO2 94%   BMI 24.68 kg/m²     General: NAD  HEENT: No thrush or mucositis, EOMI, PERRLA  Heart:  Sinus tachy, no murmurs, gallops, or rubs. Lungs:  Decreased BS bilaterally, no wheeze, rales or rhonchi  Abd: BS present, nontender, nondistended, no masses  Extrem:  No clubbing, cyanosis.  Trace BLE edema  Lymphatics: No palpable adenopathy in cervical and supraclavicular regions  Skin: Intact, no petechia or purpura    CBC with Differential:    Lab Results   Component Value Date    WBC 9.6 03/21/2020    RBC 3.28 03/21/2020    HGB 9.2 03/21/2020    HCT 29.6 03/21/2020     03/21/2020    MCV 90.2 03/21/2020    MCH 28.0 03/21/2020    MCHC 31.1 03/21/2020    RDW 17.9 03/21/2020    LYMPHOPCT 2.2 03/21/2020    MONOPCT 6.4 03/21/2020    MYELOPCT 1.7 04/21/2019    BASOPCT 0.1 03/21/2020    MONOSABS 0.61 03/21/2020    LYMPHSABS 0.21 03/21/2020    EOSABS 0.00 03/21/2020    BASOSABS 0.01 03/21/2020     CMP:    Lab Results   Component Value Date     03/21/2020    K 3.2 03/21/2020    CL 95 03/21/2020    CO2 21 03/21/2020    BUN 28 03/21/2020    CREATININE 1.2 03/21/2020    GFRAA >60 03/21/2020    LABGLOM 59 03/21/2020    GLUCOSE 198 03/21/2020    PROT 5.9 03/21/2020    LABALBU 2.8 03/21/2020    CALCIUM 8.4 03/21/2020    BILITOT 0.6 03/21/2020    ALKPHOS 67 03/21/2020    AST 13 03/21/2020    ALT 8 03/21/2020      OCLI:[id number]}       Current Facility-Administered Medications:     fentaNYL (DURAGESIC) 25 MCG/HR 1 patch, 1 patch, Transdermal, Q72H, Sonya Ward MD, 1 patch at 03/21/20 173    potassium chloride (KLOR-CON M) extended release tablet 20 mEq, 20 mEq, Oral, BID WC, Carmelo Waters MD, 20 mEq at 03/22/20 4561    amiodarone (CORDARONE) tablet 200 mg, 200 mg, Oral, Daily, Carmelo Waters MD, 200 mg at 03/22/20 0919    calcium elemental (OSCAL) tablet 500 mg, 1 tablet, Oral, BID, Carmeol Waters MD, 500 mg at 03/22/20 0919    enzalutamide (XTANDI) capsule 160 mg, 160 mg, Oral, Rosibel Grove MD, 160 mg at 03/21/20 1722    montelukast (SINGULAIR) tablet 10 mg, 10 mg, Oral, Nightly, Carmelo Waters MD, 10 mg at 03/21/20 2122    oxybutynin (DITROPAN-XL) extended release tablet 10 mg, 10 mg, Oral, Daily PRN, Carmelo Waters MD    vitamin C (ASCORBIC ACID) tablet 500 mg, 500 mg, Oral, Daily, Carmelo Waters MD, 500 mg at 03/22/20 0919    sodium chloride flush 0.9 % injection 10 mL, 10 mL, Intravenous, 2 times per day, Carmelo Waters MD, 10 mL at 03/22/20 0919    sodium chloride flush 0.9 % injection 10 mL, 10 mL, Intravenous, PRN, Carmelo Waters MD, 10 mL at 03/20/20 1726    acetaminophen (TYLENOL) tablet 650 mg, 650 mg, Oral, Q6H PRN, 650 mg at 03/21/20 2122 **OR** acetaminophen (TYLENOL) suppository 650 mg, 650 mg, Rectal, Q6H PRN, Carmelo Waters MD    polyethylene glycol (GLYCOLAX) packet 17 g, 17 g, Oral, Daily PRN, Carmelo Waters MD    promethazine (PHENERGAN) tablet 12.5 mg, 12.5 mg, Oral, Q6H PRN **OR** ondansetron (ZOFRAN) injection 4 mg, 4 mg, Intravenous, Q6H PRN, Carmelo Waters MD    enoxaparin (LOVENOX) injection 40 mg, 40 mg, Subcutaneous, Daily, Carmelo Waters MD, 40 mg at 03/22/20 0919    ipratropium (ATROVENT) 0.02 % nebulizer solution 0.5 mg, 0.5 mg, Nebulization, Q4H WA, Carmelo Waters MD, 0.5 mg at 03/22/20 0847    piperacillin-tazobactam (ZOSYN) 3.375 g in dextrose 5 % 100 mL IVPB extended infusion (mini-bag), 3.375 g, Intravenous, Q8H, Last Rate: 25 mL/hr at 03/22/20 0919, 3.375 g at 03/22/20 0919 **AND** 0.9 % sodium chloride infusion admixture, , Intravenous, Q8H, Winters Sorrel HANDY Hoover MD, Stopped at 03/22/20 0752    [COMPLETED] vancomycin 1.5 g in dextrose 5% 300 mL IVPB, 1,500 mg, Intravenous, Once, Stopped at 03/20/20 2117 **FOLLOWED BY** vancomycin (VANCOCIN) 750 mg in dextrose 5 % 250 mL IVPB, 750 mg, Intravenous, Q12H, Casa Chapman MD, Stopped at 03/22/20 9838    0.9 % sodium chloride infusion, , Intravenous, Continuous, Florencio Fails, DO, Last Rate: 125 mL/hr at 03/21/20 1722    XR CHEST PORTABLE   Final Result      Moderate right-sided pleural effusion small left-sided pleural   effusion      No evidence of airspace consolidation or pulmonary venous congestion         CT HEAD WO CONTRAST   Final Result      NO ACUTE INTRACRANIAL PROCESS         US THORACENTESIS   Final Result   Ultrasound guidance for right thoracentesis. CT ABDOMEN PELVIS W IV CONTRAST Additional Contrast? None   Final Result   Diffuse metastatic malignancy in the abdomen and pelvis with the   multiple hypodense infiltrative hepatic lesions with the rim of soft   tissue mass surrounding the liver concerning for diffuse hepatic   malignancy. Large necrotic mass in the left kidney concerning for renal cell   carcinoma with the moderately enlarged retroperitoneal and inguinal   lymphadenopathy. A large lobulated rectal mass concerning for malignancy. There is also   enlarged prostate gland concerning for malignancy with possible   malignancy involving the urinary bladder which is  thickened with   hydronephrosis. Infiltrates and pleural effusion right lung base with a masslike   density in the right lung base which may represent focal consolidation   versus malignancy. Consider PET CT scan. Extensive skeletal metastasis. ALERT:  THIS IS AN ABNORMAL REPORT      XR CHEST PORTABLE   Final Result   Patchy and masslike infiltrates and pleural effusions in the right   lung base concerning for complicated pneumonia. Underlying malignancy   is not excluded.  Surveillance preferably by CT scan is recommended. Assessment:    Active Problems:    UTI (urinary tract infection)  Resolved Problems:    * No resolved hospital problems. *    75 yo gentleman known to Dr. Prateek Osman with hx of stage IV prostate cancer involving the bone, currently stable on Ольга Maple since April 2019, completed palliative radiation to the lumbar sacral spine April 2019. 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 Treated with Carbo and -16 from May 20, 2019 to Sept 18, 2019 and had almost CR on PET scan July 2019. Had recurrent disease in the liver Feb 2020. Started Keytruda Feb 13, 2020 and has had 2 cycles so far, last on 3/16/2020. He was recently at [facility] for urosepsis earlier in March. Also has hx of Radiation proctosigmoiditis, had a recent flexible Sigmoidoscopy in Feb 2020.  -Admitted from SNF with increased SOB, cough, fever 101.5 along with abdominal pain. CT abd/pelvis diffuse metastatic malignancy with multiple hepatic lesions. Large necrotic mass in left kidney with moderately enlarged retroperitoneal and inguinal LAD. Large lobulated rectal mass. Also enlarged prostate gland with possible malignancy involving urinary bladder which is thickened with hydronephrosis. Infiltrates and pleural effusion right lung base with masslike density in the right lung base. Extensive skeletal metastasis. He underwent thoracentesis. Plan:  Compared to prior CT from [facility] (2/4/20), he has a new pleural effusion and the dominant liver lesion appears to have increased from 5.9 x 5.2 cm to 6 x 10 cm. Follow up on cytology   Abx for catheter related UTI and suspected PNA, ID following.    Check iron studies       Electronically signed by Enrico Chun MD on 3/22/2020 at 9:57 AM

## 2020-06-30 ENCOUNTER — TELEPHONE (OUTPATIENT)
Dept: FAMILY MEDICINE CLINIC | Age: 55
End: 2020-06-30

## 2020-06-30 ENCOUNTER — VIRTUAL VISIT (OUTPATIENT)
Dept: FAMILY MEDICINE CLINIC | Age: 55
End: 2020-06-30

## 2020-06-30 DIAGNOSIS — D84.9 IMMUNOCOMPROMISED (HCC): Primary | ICD-10-CM

## 2020-06-30 NOTE — PROGRESS NOTES
Pt did not answer phone call or return voicemail. Received call several hours after appt in order to cancel as the pt is admitted to the hospital. Follow as needed after discharge.

## 2020-06-30 NOTE — TELEPHONE ENCOUNTER
----- Message from Cammy Cano sent at 6/30/2020 12:12 PM EDT -----  Regarding: Dr. Watson Irvin first and last name:  Jeff Russotis  Reason for call:  caller wanted to cancel appt (call happened at 12:10 and the pt is curretnly in the hospital. However when the caller made the call, the pt's appt already happened at 9 am today and was checked out. The caller was frustrated with the fact that I couldn't cancel an appt that was already checked out, so I informed the caller that the office will get back in contact with her about the matter since the pt is in the hospital at the moment.   Callback required yes/no and why: yes   Best contact number(s): [phone]  Details to clarify the request:

## 2020-06-30 NOTE — TELEPHONE ENCOUNTER
Called in refills for Keppra to [facility] refills    Voicemail not set up yet. Unable to leave message. Physician was not able to make contact with patient for appointment this morning. Since time had already passed, we are unable to \"cancel\" the appointment. Appointment will be \"errored\" out.

## 2021-12-20 ENCOUNTER — HOSPITAL ENCOUNTER (OUTPATIENT)
Age: 56
Setting detail: OBSERVATION
Discharge: HOME | End: 2021-12-21
Attending: EMERGENCY MEDICINE | Admitting: INTERNAL MEDICINE
Payer: MEDICARE

## 2021-12-20 DIAGNOSIS — N18.6 END STAGE RENAL DISEASE ON DIALYSIS (HCC): ICD-10-CM

## 2021-12-20 DIAGNOSIS — Z99.2 END STAGE RENAL DISEASE ON DIALYSIS (HCC): ICD-10-CM

## 2021-12-20 DIAGNOSIS — D62 ACUTE BLOOD LOSS ANEMIA (ABLA): Primary | ICD-10-CM

## 2021-12-20 DIAGNOSIS — E11.22 TYPE 2 DIABETES MELLITUS WITH CHRONIC KIDNEY DISEASE, WITH LONG-TERM CURRENT USE OF INSULIN, UNSPECIFIED CKD STAGE (HCC): ICD-10-CM

## 2021-12-20 DIAGNOSIS — Z79.4 TYPE 2 DIABETES MELLITUS WITH CHRONIC KIDNEY DISEASE, WITH LONG-TERM CURRENT USE OF INSULIN, UNSPECIFIED CKD STAGE (HCC): ICD-10-CM

## 2021-12-20 PROBLEM — R42 DIZZINESS: Status: ACTIVE | Noted: 2021-12-20

## 2021-12-20 PROBLEM — D64.9 ANEMIA: Status: ACTIVE | Noted: 2021-12-20

## 2021-12-20 LAB
ALBUMIN SERPL-MCNC: 3 G/DL (ref 3.5–5)
ALBUMIN/GLOB SERPL: 0.7 {RATIO} (ref 1.1–2.2)
ALP SERPL-CCNC: 82 U/L (ref 45–117)
ALT SERPL-CCNC: <6 U/L (ref 12–78)
ANION GAP SERPL CALC-SCNC: 9 MMOL/L (ref 5–15)
APTT PPP: 27.6 SEC (ref 22.1–31)
AST SERPL-CCNC: 13 U/L (ref 15–37)
ATRIAL RATE: 85 BPM
BASOPHILS # BLD: 0.1 K/UL (ref 0–0.1)
BASOPHILS NFR BLD: 1 % (ref 0–1)
BILIRUB SERPL-MCNC: 0.4 MG/DL (ref 0.2–1)
BUN SERPL-MCNC: 34 MG/DL (ref 6–20)
BUN/CREAT SERPL: 5 (ref 12–20)
CALCIUM SERPL-MCNC: 8.9 MG/DL (ref 8.5–10.1)
CALCULATED P AXIS, ECG09: 44 DEGREES
CALCULATED R AXIS, ECG10: 24 DEGREES
CALCULATED T AXIS, ECG11: 129 DEGREES
CHLORIDE SERPL-SCNC: 102 MMOL/L (ref 97–108)
CO2 SERPL-SCNC: 28 MMOL/L (ref 21–32)
CREAT SERPL-MCNC: 6.98 MG/DL (ref 0.7–1.3)
DIAGNOSIS, 93000: NORMAL
DIFFERENTIAL METHOD BLD: ABNORMAL
EOSINOPHIL # BLD: 0.1 K/UL (ref 0–0.4)
EOSINOPHIL NFR BLD: 2 % (ref 0–7)
ERYTHROCYTE [DISTWIDTH] IN BLOOD BY AUTOMATED COUNT: 16.6 % (ref 11.5–14.5)
ETHANOL SERPL-MCNC: <10 MG/DL
FERRITIN SERPL-MCNC: 222 NG/ML (ref 26–388)
FOLATE SERPL-MCNC: 8.2 NG/ML (ref 5–21)
GLOBULIN SER CALC-MCNC: 4.4 G/DL (ref 2–4)
GLUCOSE BLD STRIP.AUTO-MCNC: 80 MG/DL (ref 65–117)
GLUCOSE BLD STRIP.AUTO-MCNC: 84 MG/DL (ref 65–117)
GLUCOSE SERPL-MCNC: 89 MG/DL (ref 65–100)
HAPTOGLOB SERPL-MCNC: 243 MG/DL (ref 30–200)
HBV SURFACE AB SER QL: NONREACTIVE
HBV SURFACE AB SER-ACNC: <3.1 MIU/ML
HBV SURFACE AG SER QL: <0.1 INDEX
HBV SURFACE AG SER QL: NEGATIVE
HCT VFR BLD AUTO: 19.8 % (ref 36.6–50.3)
HCT VFR BLD AUTO: 24.2 % (ref 36.6–50.3)
HGB BLD-MCNC: 5.7 G/DL (ref 12.1–17)
HGB BLD-MCNC: 7.4 G/DL (ref 12.1–17)
HISTORY CHECKED?,CKHIST: NORMAL
HISTORY CHECKED?,CKHIST: NORMAL
IMM GRANULOCYTES # BLD AUTO: 0 K/UL (ref 0–0.04)
IMM GRANULOCYTES NFR BLD AUTO: 0 % (ref 0–0.5)
INR PPP: 1.1 (ref 0.9–1.1)
IRON SATN MFR SERPL: 28 % (ref 20–50)
IRON SERPL-MCNC: 50 UG/DL (ref 35–150)
LDH SERPL L TO P-CCNC: 169 U/L (ref 85–241)
LYMPHOCYTES # BLD: 0.8 K/UL (ref 0.8–3.5)
LYMPHOCYTES NFR BLD: 16 % (ref 12–49)
MAGNESIUM SERPL-MCNC: 1.8 MG/DL (ref 1.6–2.4)
MCH RBC QN AUTO: 27.5 PG (ref 26–34)
MCHC RBC AUTO-ENTMCNC: 28.8 G/DL (ref 30–36.5)
MCV RBC AUTO: 95.7 FL (ref 80–99)
MONOCYTES # BLD: 0.4 K/UL (ref 0–1)
MONOCYTES NFR BLD: 8 % (ref 5–13)
NEUTS SEG # BLD: 3.8 K/UL (ref 1.8–8)
NEUTS SEG NFR BLD: 73 % (ref 32–75)
NRBC # BLD: 0 K/UL (ref 0–0.01)
NRBC BLD-RTO: 0 PER 100 WBC
P-R INTERVAL, ECG05: 236 MS
PATH REV BLD -IMP: NORMAL
PHOSPHATE SERPL-MCNC: 3.2 MG/DL (ref 2.6–4.7)
PLATELET # BLD AUTO: 197 K/UL (ref 150–400)
PMV BLD AUTO: 11.1 FL (ref 8.9–12.9)
POTASSIUM SERPL-SCNC: 3.2 MMOL/L (ref 3.5–5.1)
PROCALCITONIN SERPL-MCNC: 0.28 NG/ML
PROT SERPL-MCNC: 7.4 G/DL (ref 6.4–8.2)
PROTHROMBIN TIME: 11.6 SEC (ref 9–11.1)
Q-T INTERVAL, ECG07: 394 MS
QRS DURATION, ECG06: 100 MS
QTC CALCULATION (BEZET), ECG08: 468 MS
RBC # BLD AUTO: 2.07 M/UL (ref 4.1–5.7)
RBC MORPH BLD: ABNORMAL
RETICS # AUTO: 0.04 M/UL (ref 0.03–0.1)
RETICS/RBC NFR AUTO: 2 % (ref 0.7–2.1)
SERVICE CMNT-IMP: NORMAL
SERVICE CMNT-IMP: NORMAL
SODIUM SERPL-SCNC: 139 MMOL/L (ref 136–145)
THERAPEUTIC RANGE,PTTT: NORMAL SECS (ref 58–77)
TIBC SERPL-MCNC: 180 UG/DL (ref 250–450)
VENTRICULAR RATE, ECG03: 85 BPM
VIT B12 SERPL-MCNC: 319 PG/ML (ref 193–986)
WBC # BLD AUTO: 5.2 K/UL (ref 4.1–11.1)

## 2021-12-20 PROCEDURE — 85018 HEMOGLOBIN: CPT

## 2021-12-20 PROCEDURE — 36415 COLL VENOUS BLD VENIPUNCTURE: CPT

## 2021-12-20 PROCEDURE — P9016 RBC LEUKOCYTES REDUCED: HCPCS

## 2021-12-20 PROCEDURE — 96374 THER/PROPH/DIAG INJ IV PUSH: CPT

## 2021-12-20 PROCEDURE — 85610 PROTHROMBIN TIME: CPT

## 2021-12-20 PROCEDURE — C9113 INJ PANTOPRAZOLE SODIUM, VIA: HCPCS | Performed by: INTERNAL MEDICINE

## 2021-12-20 PROCEDURE — 85045 AUTOMATED RETICULOCYTE COUNT: CPT

## 2021-12-20 PROCEDURE — 74011250637 HC RX REV CODE- 250/637: Performed by: INTERNAL MEDICINE

## 2021-12-20 PROCEDURE — 83010 ASSAY OF HAPTOGLOBIN QUANT: CPT

## 2021-12-20 PROCEDURE — 65270000029 HC RM PRIVATE

## 2021-12-20 PROCEDURE — 83735 ASSAY OF MAGNESIUM: CPT

## 2021-12-20 PROCEDURE — 82728 ASSAY OF FERRITIN: CPT

## 2021-12-20 PROCEDURE — 65390000012 HC CONDITION CODE 44 OBSERVATION

## 2021-12-20 PROCEDURE — 80053 COMPREHEN METABOLIC PANEL: CPT

## 2021-12-20 PROCEDURE — 93005 ELECTROCARDIOGRAM TRACING: CPT

## 2021-12-20 PROCEDURE — 86901 BLOOD TYPING SEROLOGIC RH(D): CPT

## 2021-12-20 PROCEDURE — 86923 COMPATIBILITY TEST ELECTRIC: CPT

## 2021-12-20 PROCEDURE — 96376 TX/PRO/DX INJ SAME DRUG ADON: CPT

## 2021-12-20 PROCEDURE — 84100 ASSAY OF PHOSPHORUS: CPT

## 2021-12-20 PROCEDURE — 82746 ASSAY OF FOLIC ACID SERUM: CPT

## 2021-12-20 PROCEDURE — 83550 IRON BINDING TEST: CPT

## 2021-12-20 PROCEDURE — 74011000250 HC RX REV CODE- 250: Performed by: INTERNAL MEDICINE

## 2021-12-20 PROCEDURE — 97161 PT EVAL LOW COMPLEX 20 MIN: CPT

## 2021-12-20 PROCEDURE — 86706 HEP B SURFACE ANTIBODY: CPT

## 2021-12-20 PROCEDURE — 36430 TRANSFUSION BLD/BLD COMPNT: CPT

## 2021-12-20 PROCEDURE — 82077 ASSAY SPEC XCP UR&BREATH IA: CPT

## 2021-12-20 PROCEDURE — 82607 VITAMIN B-12: CPT

## 2021-12-20 PROCEDURE — 84145 PROCALCITONIN (PCT): CPT

## 2021-12-20 PROCEDURE — 87340 HEPATITIS B SURFACE AG IA: CPT

## 2021-12-20 PROCEDURE — G0257 UNSCHED DIALYSIS ESRD PT HOS: HCPCS

## 2021-12-20 PROCEDURE — 82962 GLUCOSE BLOOD TEST: CPT

## 2021-12-20 PROCEDURE — 83615 LACTATE (LD) (LDH) ENZYME: CPT

## 2021-12-20 PROCEDURE — 74011250636 HC RX REV CODE- 250/636: Performed by: INTERNAL MEDICINE

## 2021-12-20 PROCEDURE — 85730 THROMBOPLASTIN TIME PARTIAL: CPT

## 2021-12-20 PROCEDURE — 90935 HEMODIALYSIS ONE EVALUATION: CPT

## 2021-12-20 PROCEDURE — 85025 COMPLETE CBC W/AUTO DIFF WBC: CPT

## 2021-12-20 PROCEDURE — 99284 EMERGENCY DEPT VISIT MOD MDM: CPT

## 2021-12-20 RX ORDER — PRAVASTATIN SODIUM 20 MG/1
80 TABLET ORAL
Status: DISCONTINUED | OUTPATIENT
Start: 2021-12-20 | End: 2021-12-21 | Stop reason: HOSPADM

## 2021-12-20 RX ORDER — INSULIN GLARGINE 100 [IU]/ML
50 INJECTION, SOLUTION SUBCUTANEOUS DAILY
Status: DISCONTINUED | OUTPATIENT
Start: 2021-12-21 | End: 2021-12-21 | Stop reason: HOSPADM

## 2021-12-20 RX ORDER — NIFEDIPINE 30 MG/1
60 TABLET, EXTENDED RELEASE ORAL DAILY
Status: DISCONTINUED | OUTPATIENT
Start: 2021-12-21 | End: 2021-12-21 | Stop reason: HOSPADM

## 2021-12-20 RX ORDER — SODIUM CHLORIDE 9 MG/ML
250 INJECTION, SOLUTION INTRAVENOUS AS NEEDED
Status: DISCONTINUED | OUTPATIENT
Start: 2021-12-20 | End: 2021-12-21 | Stop reason: HOSPADM

## 2021-12-20 RX ORDER — GUAIFENESIN 100 MG/5ML
81 LIQUID (ML) ORAL DAILY
COMMUNITY
End: 2021-12-21

## 2021-12-20 RX ORDER — ONDANSETRON 2 MG/ML
4 INJECTION INTRAMUSCULAR; INTRAVENOUS
Status: DISCONTINUED | OUTPATIENT
Start: 2021-12-20 | End: 2021-12-21 | Stop reason: HOSPADM

## 2021-12-20 RX ORDER — ALBUTEROL SULFATE 90 UG/1
2 AEROSOL, METERED RESPIRATORY (INHALATION)
COMMUNITY

## 2021-12-20 RX ORDER — NIFEDIPINE 60 MG/1
60 TABLET, EXTENDED RELEASE ORAL DAILY
COMMUNITY

## 2021-12-20 RX ORDER — INSULIN GLARGINE 100 [IU]/ML
50 INJECTION, SOLUTION SUBCUTANEOUS DAILY
COMMUNITY
End: 2022-02-15 | Stop reason: SDUPTHER

## 2021-12-20 RX ORDER — INSULIN ASPART 100 [IU]/ML
5 INJECTION, SOLUTION INTRAVENOUS; SUBCUTANEOUS
COMMUNITY
End: 2022-02-15

## 2021-12-20 RX ORDER — PREDNISONE 5 MG/1
2.5 TABLET ORAL
Status: DISCONTINUED | OUTPATIENT
Start: 2021-12-21 | End: 2021-12-21 | Stop reason: HOSPADM

## 2021-12-20 RX ORDER — SODIUM BICARBONATE 650 MG/1
1300 TABLET ORAL 2 TIMES DAILY WITH MEALS
COMMUNITY
End: 2022-10-14 | Stop reason: SDUPTHER

## 2021-12-20 RX ORDER — CARVEDILOL 6.25 MG/1
6.25 TABLET ORAL 2 TIMES DAILY WITH MEALS
Status: DISCONTINUED | OUTPATIENT
Start: 2021-12-20 | End: 2021-12-21 | Stop reason: HOSPADM

## 2021-12-20 RX ORDER — SODIUM BICARBONATE 650 MG/1
1300 TABLET ORAL 2 TIMES DAILY WITH MEALS
Status: DISCONTINUED | OUTPATIENT
Start: 2021-12-20 | End: 2021-12-21 | Stop reason: HOSPADM

## 2021-12-20 RX ORDER — POLYETHYLENE GLYCOL 3350 17 G/17G
17 POWDER, FOR SOLUTION ORAL DAILY PRN
Status: DISCONTINUED | OUTPATIENT
Start: 2021-12-20 | End: 2021-12-21 | Stop reason: HOSPADM

## 2021-12-20 RX ORDER — PRAVASTATIN SODIUM 80 MG/1
80 TABLET ORAL
COMMUNITY

## 2021-12-20 RX ORDER — DEXTROSE 50 % IN WATER (D50W) INTRAVENOUS SYRINGE
25-50 AS NEEDED
Status: DISCONTINUED | OUTPATIENT
Start: 2021-12-20 | End: 2021-12-21 | Stop reason: HOSPADM

## 2021-12-20 RX ORDER — INSULIN LISPRO 100 [IU]/ML
INJECTION, SOLUTION INTRAVENOUS; SUBCUTANEOUS
Status: DISCONTINUED | OUTPATIENT
Start: 2021-12-20 | End: 2021-12-21 | Stop reason: HOSPADM

## 2021-12-20 RX ORDER — OXYCODONE HYDROCHLORIDE 5 MG/1
5 TABLET ORAL
COMMUNITY
End: 2022-07-05 | Stop reason: SDUPTHER

## 2021-12-20 RX ORDER — ACETAMINOPHEN 650 MG/1
650 SUPPOSITORY RECTAL
Status: DISCONTINUED | OUTPATIENT
Start: 2021-12-20 | End: 2021-12-21 | Stop reason: HOSPADM

## 2021-12-20 RX ORDER — MAGNESIUM SULFATE 100 %
4 CRYSTALS MISCELLANEOUS AS NEEDED
Status: DISCONTINUED | OUTPATIENT
Start: 2021-12-20 | End: 2021-12-21 | Stop reason: HOSPADM

## 2021-12-20 RX ORDER — SODIUM ZIRCONIUM CYCLOSILICATE 10 G/10G
10 POWDER, FOR SUSPENSION ORAL DAILY
COMMUNITY

## 2021-12-20 RX ORDER — TACROLIMUS 1 MG/1
1 CAPSULE ORAL 2 TIMES DAILY
COMMUNITY

## 2021-12-20 RX ORDER — METOPROLOL TARTRATE 50 MG/1
100 TABLET ORAL 2 TIMES DAILY
Status: DISCONTINUED | OUTPATIENT
Start: 2021-12-20 | End: 2021-12-20

## 2021-12-20 RX ORDER — MELATONIN
2000 DAILY
COMMUNITY

## 2021-12-20 RX ORDER — ONDANSETRON 4 MG/1
4 TABLET, ORALLY DISINTEGRATING ORAL
Status: DISCONTINUED | OUTPATIENT
Start: 2021-12-20 | End: 2021-12-21 | Stop reason: HOSPADM

## 2021-12-20 RX ORDER — HYDRALAZINE HYDROCHLORIDE 50 MG/1
25 TABLET, FILM COATED ORAL 3 TIMES DAILY
COMMUNITY

## 2021-12-20 RX ORDER — MYCOPHENOLATE MOFETIL 250 MG/1
500 CAPSULE ORAL
Status: DISCONTINUED | OUTPATIENT
Start: 2021-12-20 | End: 2021-12-21 | Stop reason: HOSPADM

## 2021-12-20 RX ORDER — SODIUM CHLORIDE 0.9 % (FLUSH) 0.9 %
5-40 SYRINGE (ML) INJECTION EVERY 8 HOURS
Status: DISCONTINUED | OUTPATIENT
Start: 2021-12-20 | End: 2021-12-21 | Stop reason: HOSPADM

## 2021-12-20 RX ORDER — TACROLIMUS 1 MG/1
1 CAPSULE ORAL
Status: DISCONTINUED | OUTPATIENT
Start: 2021-12-20 | End: 2021-12-21 | Stop reason: HOSPADM

## 2021-12-20 RX ORDER — CARVEDILOL 6.25 MG/1
6.25 TABLET ORAL 2 TIMES DAILY
COMMUNITY

## 2021-12-20 RX ORDER — HYDRALAZINE HYDROCHLORIDE 25 MG/1
25 TABLET, FILM COATED ORAL 3 TIMES DAILY
Status: DISCONTINUED | OUTPATIENT
Start: 2021-12-20 | End: 2021-12-21 | Stop reason: HOSPADM

## 2021-12-20 RX ORDER — SODIUM CHLORIDE 0.9 % (FLUSH) 0.9 %
5-40 SYRINGE (ML) INJECTION AS NEEDED
Status: DISCONTINUED | OUTPATIENT
Start: 2021-12-20 | End: 2021-12-21 | Stop reason: HOSPADM

## 2021-12-20 RX ORDER — ACETAMINOPHEN 325 MG/1
650 TABLET ORAL
Status: DISCONTINUED | OUTPATIENT
Start: 2021-12-20 | End: 2021-12-21 | Stop reason: HOSPADM

## 2021-12-20 RX ADMIN — MYCOPHENOLATE MOFETIL 500 MG: 250 CAPSULE ORAL at 17:43

## 2021-12-20 RX ADMIN — SODIUM CHLORIDE 40 MG: 9 INJECTION INTRAMUSCULAR; INTRAVENOUS; SUBCUTANEOUS at 22:07

## 2021-12-20 RX ADMIN — CARVEDILOL 6.25 MG: 6.25 TABLET, FILM COATED ORAL at 17:43

## 2021-12-20 RX ADMIN — HYDRALAZINE HYDROCHLORIDE 25 MG: 25 TABLET, FILM COATED ORAL at 22:07

## 2021-12-20 RX ADMIN — PRAVASTATIN SODIUM 80 MG: 20 TABLET ORAL at 22:07

## 2021-12-20 RX ADMIN — SODIUM CHLORIDE 40 MG: 9 INJECTION INTRAMUSCULAR; INTRAVENOUS; SUBCUTANEOUS at 12:47

## 2021-12-20 RX ADMIN — TACROLIMUS 1 MG: 1 CAPSULE ORAL at 17:43

## 2021-12-20 RX ADMIN — SODIUM BICARBONATE 1300 MG: 650 TABLET ORAL at 17:43

## 2021-12-20 RX ADMIN — HYDRALAZINE HYDROCHLORIDE 25 MG: 25 TABLET, FILM COATED ORAL at 17:43

## 2021-12-20 NOTE — H&P
[facility]    Hospitalist Admission Note      NAME:  Abel Glover   :   1965   MRN:  [id number]     PCP:  Anjana Workman MD     Date/Time of service:  2021 12:37 PM          Subjective:     CHIEF COMPLAINT: anemia     HISTORY OF PRESENT ILLNESS:     Mr. Brandon Pizarro is a 64 y.o.  male who presented to the Emergency Department complaining of anemia. He is followed at [facility] and was asked to go to an ER for anemia and HD. Recently had AV fistula placed for ESRD on HD. He has felt dizzy. ER finds severe anemia. WE are awaiting verification of his current meds. We will admit him for management. Past Medical History:   Diagnosis Date    Anemia due to chronic kidney disease     CAD (coronary artery disease)     Status post non-Q wave MI May 2010 with cardiac catheterization and a subsequent CABG with LIMA to LAD and sequential saphenous vein graft to posterolateral branch and PDA by Dr. Abdon Holt.  Deaf     [facility]     [facility] [address] [facility]     GERD (gastroesophageal reflux disease)     Gout     HTN (hypertension)     Hyperlipidemia     Kidney replaced by transplant     PAD (peripheral artery disease) [facility] [address]         Past Surgical History:   Procedure Laterality Date    HX BELOW KNEE AMPUTATION Right 2019    HX CORONARY ARTERY BYPASS GRAFT      LIMA to LAD and sequential saphenous vein graft to posterolateral branch and PDA by Dr. Abdon Holt.  HX TRANSPLANT      kid2013    [place] CABG, VEIN, THREE      May 2011    [place] CARDIAC SURG PROCEDURE UNLIST         Social History     Tobacco Use    Smoking status: Passive Smoke Exposure - Never Smoker    Smokeless tobacco: Never Used   Substance Use Topics    Alcohol use:  Yes     Alcohol/week: 4.2 standard drinks     Types: 5 Cans of beer per week        Family History   Problem Relation Age of Onset    Cancer Mother     Diabetes Mother     Cancer Father     Heart Disease Other Family hx cannot be fully assessed, since the patient cannot provide information    No Known Allergies     Prior to Admission medications    Medication Sig Start Date End Date Taking? Authorizing Provider   insulin aspart U-100 (NOVOLOG) 100 unit/mL (3 mL) inpn INJECT 10 UNITS UNDER THE SKIN AT 9AM, 14 UNITS AT 12PM AND 10 UNITS AT 5PM 5/18/20   Zia Rogers MD   hydrALAZINE (APRESOLINE) 100 mg tablet TAKE 1 TABLET BY MOUTH THREE TIMES DAILY (PATIENT NEEDS LABS AND FOLLOW UP) 4/14/20   Zia Rogers MD   mycophenolate (CELLCEPT) 500 mg tablet Take 1,000 mg by mouth two (2) times a day. Take 2 tablets by mouth twice a day    Provider, Historical   insulin glargine (LANTUS U-100 INSULIN) 100 unit/mL injection 39 Units by SubCUTAneous route two (2) times a day. Caution: Long Acting Insulin. DO NOT HOLD without physician order. DO NOT MIX or dilute with any other insulin. 2/24/20   Zia Rogers MD   metoprolol tartrate (LOPRESSOR) 100 mg IR tablet Take 1 Tab by mouth two (2) times a day. 8/29/19   Diane Copeland MD   tamsulosin (FLOMAX) 0.4 mg capsule Take 0.4 mg by mouth daily. Provider, Historical   cholecalciferol, vitamin D3, (VITAMIN D3) 2,000 unit tab Take 4,000 Int'l Units by mouth daily. Provider, Historical   NULOJIX 250 mg injection BELATACEPT 525 MG / 100 ML NS IV OVER 30 MINUTES EXP: 1/17/19   Provider, Historical   pravastatin (PRAVACHOL) 40 mg tablet Take 1 Tab by mouth nightly. 1/25/19   Zia Rogers MD   allopurinol (ZYLOPRIM) 100 mg tablet Take 1 Tab by mouth daily. 1/25/19   Zia Rogers MD   amLODIPine (NORVASC) 10 mg tablet Take 1 Tab by mouth daily. 1/25/19   Zia Rogers MD   compr.stocking,knee,long,large (COMP STOCKING,KNEE,LONG,LARGE) misc Wear on the right leg for swelling. Goal 20-40 mmhg of pressure. 1/25/19   Zia Rogers MD   predniSONE (DELTASONE) 5 mg tablet Take 5 mg by mouth daily.     Provider, Historical Dexlansoprazole (DEXILANT) 60 mg CpDM Take 30 mg by mouth daily (with breakfast). Provider, Historical   aspirin 81 mg tablet Take  by mouth.     Provider, Historical       Review of Systems:  (bold if positive, if negative)    Gen:  fatigueEyes:  ENT:  CVS:  dizzinessPulm:  GI:  :  MS:  Skin:  Psych:  Endo:  Hem:  Renal:  Neuro:  weakness      Objective:      VITALS:    Vital signs reviewed; most recent are:    Visit Vitals  /71 (BP 1 Location: Right upper arm)   Pulse 92   Temp 97.5 °F (36.4 °C)   Resp 18   Ht 5' 11\" (1.803 m)   Wt 9.979 kg (22 lb)   SpO2 100%   BMI 3.07 kg/m²     SpO2 Readings from Last 6 Encounters:   12/20/21 100%   07/30/19 98%   07/16/19 97%   04/16/19 98%   02/14/19 99%   02/01/19 95%        No intake or output data in the 24 hours ending 12/20/21 1237     Exam:     Physical Exam:    Gen:  Well-developed, well-nourished, in no acute distress  HEENT:  Pale conjunctivae, PERRL, hearing not intact to voice, moist mucous membranes  Neck:  Supple, without masses, thyroid non-tender  Resp:  No accessory muscle use, clear breath sounds without wheezes rales or rhonchi  Card:  No murmurs, tachycardic S1, S2 without thrills, AV bruits, or peripheral edema  Abd:  Soft, non-tender, non-distended, normoactive bowel sounds are present, no mass  Lymph:  No cervical or inguinal adenopathy  Musc:  No cyanosis or clubbing  Skin:  No rashes or ulcers, skin turgor is good  Neuro:  Cranial nerves are grossly intact, mild motor weakness, follows commands   Psych:  Good insight, oriented to person, place and time, alert     Labs:    Recent Labs     12/20/21  0908   WBC 5.2   HGB 5.7*   HCT 19.8*        Recent Labs     12/20/21  0908      K 3.2*      CO2 28   GLU 89   BUN 34*   CREA 6.98*   CA 8.9   MG 1.8   PHOS 3.2   ALB 3.0*   TBILI 0.4   ALT <6*     Lab Results   Component Value Date/Time    Glucose (POC) 245 (H) 08/15/2013 04:42 PM    Glucose (POC) 279 (H) 08/15/2013 01:24 PM No results for input(s): PH, PCO2, PO2, HCO3, FIO2 in the last 72 hours. Recent Labs     12/20/21  1213   INR 1.1     All Micro Results     None          I have reviewed previous records       Assessment and Plan:      Anemia, symptomatic / Hx Anemia due to chronic kidney disease / Dizziness - POA, severe. Unclear chronicity or cause. Check serologies and hemoccult. Transfuse 2 units. EPO per renal.     End stage renal disease on dialysis / Hypokalemia / Kidney replaced by transplant - Now back on HD. Consult renal.  Awaiting MAR. Renal diet. Previously on cellecept, prenisone    Coronary artery disease s/p CABG / HTN (hypertension) - Resume meds, previoiusly on hydralazine, metoprolol, norvasc, statin. No ASA for now. 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 SSI per protocol. Continue home insulin if eating well. A1c useless in setting of anemia      PAD (peripheral artery disease) / Hx of right BKA / Hyperlipidemia - continue statin, hold ASA due to bleeding    Deafness - Supportive care    GERD (gastroesophageal reflux disease) - PPI     Gout - Was on allopurinol    BPH - Was on flomax      Telemetry reviewed:   normal sinus rhythm    Risk of deterioration: high      Total time spent with patient: 50 Minutes I personally reviewed chart, notes, data and current medications in the medical record. I have personally examined and treated the patient at bedside during this period.                  Care Plan discussed with: Patient, Nursing Staff and >50% of time spent in counseling and coordination of care    Discussed:  Care Plan and D/C Planning       ___________________________________________________    Attending Physician: Demarcus Mendoza MD

## 2021-12-20 NOTE — PROGRESS NOTES
[facility]: MED RECONCILIATION      Medications added:   Nifedipine  Coreg  Tacrolimus- pt recently instructed to decrease to 1mg BID from 2mg BID  Sodium bicarbonate  Lokelma    Medications removed:  Allopurinol 100mg daily  Amlodipine 10mg daily  Metoprolol 100mg BID  Belatacept   Tamsulosin 0.4mg daily    Medications adjusted:  Mycophenolate- previously 1000mg BID (also dose reduced)  Prednisone- previously 5mg daily  Lantus and Novolog  Vit D3  Pravastatin    Information obtained from: Medication bottles and list, patient via  and family member over the phone. RxQuery (no data available)      Allergies: Patient has no known allergies. Prior to Admission Medications:     Medication Documentation Review Audit       Reviewed by Yobany King PHARMD (Pharmacist) on 12/20/21 at [phone]      Medication Sig Documenting Provider Last Dose Status Taking? albuterol (PROVENTIL HFA, VENTOLIN HFA, PROAIR HFA) 90 mcg/actuation inhaler Take 2 Puffs by inhalation every four (4) hours as needed for Wheezing. Provider, Historical  Active Yes   aspirin 81 mg chewable tablet Take 81 mg by mouth daily. Provider, Historical  Active Yes   carvediloL (Coreg) 6.25 mg tablet Take 6.25 mg by mouth two (2) times a day. Provider, Historical  Active Yes   cholecalciferol (Vitamin D3) (1000 Units /25 mcg) tablet Take 2,000 Units by mouth daily. Provider, Historical  Active Yes   compr.stocking,knee,long,large (COMP STOCKING,KNEE,LONG,LARGE) misc Wear on the right leg for swelling. Goal 20-40 mmhg of pressure. Patsy Tran MD  Active    Dexlansoprazole (DEXILANT) 60 mg CpDM Take 30 mg by mouth daily (with breakfast). Provider, Historical  Active Yes   hydrALAZINE (APRESOLINE) 50 mg tablet Take 25 mg by mouth three (3) times daily. Provider, Historical  Active Yes   insulin aspart U-100 (NOVOLOG) 100 unit/mL injection 5 Units by SubCUTAneous route Before breakfast, lunch, and dinner.  Provider, Historical  Active Yes insulin glargine (LANTUS,BASAGLAR) 100 unit/mL (3 mL) inpn 50 Units by SubCUTAneous route daily. Provider, Historical  Active Yes   mycophenolate (CELLCEPT) 500 mg tablet Take 500 mg by mouth two (2) times a day. Take 2 tablets by mouth twice a day Provider, Historical  Active Yes           Med Note (Raf Saha Mon Dec 20, 2021  2:53 PM) Dose reduced   NIFEdipine ER (PROCARDIA XL) 60 mg ER tablet Take 60 mg by mouth daily. Provider, Historical  Active Yes   pravastatin (PRAVACHOL) 80 mg tablet Take 80 mg by mouth nightly. Provider, Historical  Active Yes   predniSONE (DELTASONE) 5 mg tablet Take 2.5 mg by mouth daily. Provider, Historical  Active Yes           Med Note (Raf Saha Mon Dec 20, 2021  2:53 PM) Dose reduced   sodium bicarbonate 650 mg tablet Take 1,300 mg by mouth two (2) times daily (with meals). Provider, Historical  Active Yes   sodium zirconium cyclosilicate (Lokelma) 10 gram powder packet Take 10 g by mouth daily. Provider, Historical  Active Yes   tacrolimus (PROGRAF) 1 mg capsule Take 1 mg by mouth two (2) times a day. Provider, Historical  Active Yes           Med Note (Raf Saha Mon Dec 20, 2021  2:53 PM) Dose reduced recently                      Yenni Puckett.  Bonifacio Carbajal

## 2021-12-20 NOTE — DIALYSIS
Hemodialysis / [phone]    Vitals Pre Post Assessment Pre Post   BP BP: 130/77 (12/20/21 1430) 142/79 LOC A&O x 3 A&O x 3   HR Pulse (Heart Rate): 79 (second unit PRBC started) (12/20/21 1430) 75 Lungs clear clear   Resp Resp Rate: 19 (12/20/21 1430) 17 Cardiac regular regular   Temp Temp: 97 °F (36.1 °C) (12/20/21 1430) 97.5 Skin warm warm   Weight Pre-Dialysis Weight: 105.6 kg (232 lb 12.9 oz) (12/20/21 1225) 104.5 Edema No edema No edema   Tele status bedside bedside Pain Pain Intensity 1: 0 (12/20/21 0856) No pain     Orders   Duration: Start: 1230 End: 1600 Total: 3.5 hr   Dialyzer: Dialyzer/Set Up Inspection: Revlenniear ([id number]) (12/20/21 1225)   K Bath: Dialysate K (mEq/L): 4 (12/20/21 1225)   Ca Bath: Dialysate CA (mEq/L): 2.5 (12/20/21 1225)   Na: Dialysate NA (mEq/L): 140 (12/20/21 1225)   Bicarb: Dialysate HCO3 (mEq/L): 35 (12/20/21 1225)   Target Fluid Removal: Goal/Amount of Fluid to Remove (mL): 1000 mL (12/20/21 1225)     Access   Type & Location: [facility] cath   Comments: In place, patent, dressing dry and intact, no S/S of infection.                          Labs   HBsAg (Antigen) / date:        12/20/21Unknown                                      HBsAb (Antibody) / date: 12/20/21 Unknown   Source: [facility]   Obtained/Reviewed  Critical Results Called HGB   Date Value Ref Range Status   12/20/2021 5.7 (LL) 12.1 - 17.0 g/dL Final     Comment:     RESULTS VERIFIED, PHONED TO AND READ BACK BY  AMELIA MOE,[id number],DK       Potassium   Date Value Ref Range Status   12/20/2021 3.2 (L) 3.5 - 5.1 mmol/L Final     Calcium   Date Value Ref Range Status   12/20/2021 8.9 8.5 - 10.1 MG/DL Final     BUN   Date Value Ref Range Status   12/20/2021 34 (H) 6 - 20 MG/DL Final     Creatinine   Date Value Ref Range Status   12/20/2021 6.98 (H) 0.70 - 1.30 MG/DL Final        Meds Given   Name Dose Route                    Adequacy / Fluid    Total Liters Process: 67 L   Net Fluid Removed: 1000 ml      Comments   Time Out Done:   (Time) Yes, 1225   Admitting Diagnosis: Renal failure   Consent obtained/signed: Informed Consent Verified: Yes (12/20/21 1225)   Machine / RO # Machine Number: [id number] (12/20/21 1225)   Primary Nurse Rpt Pre: Monse Mathias RN   Primary Nurse Rpt Post: Monse Mathias RN   Pt Education: Yes, r/t dialysis process   Care Plan: Continue HD as ordered   Pts outpatient clinic: [facility]      Tx Summary   Comments: Tolerated tx well, at end, all blood in circuit returned with 300 ml NS, [facility] cath in place, patent, dressing dry and intact, no S/S  Of infection. Two units PRBC transfused, no adwerse reaction.

## 2021-12-20 NOTE — Clinical Note
Status[de-identified] INPATIENT [101]   Type of Bed: Telemetry [19]   Cardiac Monitoring Required?: Yes   Inpatient Hospitalization Certified Necessary for the Following Reasons: 3.  Patient receiving treatment that can only be provided in an inpatient setting (further clarification in H&P documentation)   Admitting Diagnosis: Acute blood loss anemia [195628]   Admitting Diagnosis: End stage renal disease on dialysis [facility]) [[id number]]   Admitting Physician: Sudhir Andersen   Attending Physician: Sudhir Andersen   Estimated Length of Stay: 2 Midnights   Discharge Plan[de-identified] Home with Office Follow-up

## 2021-12-20 NOTE — ED NOTES
New order for second transfusion placed in order to release and transfuse in the [facility].  Order currently remains for a total of 2 units

## 2021-12-20 NOTE — PROGRESS NOTES
NEPHROLOGY PROGRESS NOTE         NAME:Jaxon Kelly                   RVV:[id number]   :1965    Chief complaints: f/u ESKD on HD MWF @ [facility])  Admitted for anemia    Subjective: patient is mute and deaf.  Used live interpretor    24 hr interval history:         Objective:  Vitals:    21 0856   BP: 132/71   Pulse: 92   Resp: 18   Temp: 97.5 °F (36.4 °C)   SpO2: 100%   Weight: 9.979 kg (22 lb)   Height: 5' 11\" (1.803 m)     No intake or output data in the 24 hours ending 21 1154    PHYSICAL EXAM:  GENERAL : Lying down in bed with no acute distress  HEENT: AT NC PEERLA   NECK: Supple no JVP  CVS: S1 S2 RRR, no murmur or gallops heard  RS: CTABL, no rhonchi,or wheezing heard  ABDOMEN: soft NT ND positive BS  EXTREMITY: No edema clubbing or cyanosis, pedal pulse +  NEUROLOGY: AAA X3, no focal deficit or asterixis  VASCULAR ACCESS: Perm cath and LAVF placed  by [facility]     Labs:  CBC:    Lab Results   Component Value Date/Time    WBC 5.2 2021 09:08 AM    HGB 5.7 (LL) 2021 09:08 AM    HCT 19.8 (L) 2021 09:08 AM     2021 09:08 AM     BMP:   Lab Results   Component Value Date/Time     2021 09:08 AM    K 3.2 (L) 2021 09:08 AM     2021 09:08 AM    CO2 28 2021 09:08 AM    AGAP 9 2021 09:08 AM    GLU 89 2021 09:08 AM    BUN 34 (H) 2021 09:08 AM    CREA 6.98 (H) 2021 09:08 AM    GFRAA 10 (L) 2021 09:08 AM    GFRNA 8 (L) 2021 09:08 AM        Lab Results   Component Value Date/Time    Color DARK YELLOW 2013 11:10 PM    Appearance TURBID (A) 2013 11:10 PM    Specific gravity 1.021 2013 11:10 PM    pH (UA) 5.0 2013 11:10 PM    Protein 300 (A) 2013 11:10 PM    Glucose 250 (A) 2013 11:10 PM    Ketone NEGATIVE  2013 11:10 PM    Bilirubin SMALL (A) 2013 11:10 PM    Urobilinogen 0.2 2013 11:10 PM    Nitrites NEGATIVE  2013 11:10 PM Leukocyte Esterase LARGE (A) 08/14/2013 11:10 PM    Epithelial cells 20-50 08/14/2013 11:10 PM    Bacteria 4+ (A) 08/14/2013 11:10 PM    WBC >100 (H) 08/14/2013 11:10 PM    RBC 5-10 08/14/2013 11:10 PM       Imaging study:    Assessment &Plan    ESKD HD MWF  Anemia   Hypokalemia    -Plan for HD today with 4K bath  -transfuse PRBC to keep hb>7  -acute davita is notified and order placed in computer    Care Plan discussed with:   Medical Team    Estelle Esquivel MD  12/20/2021    [facility]:  [email or web address]  Kelsey Rodriguez office:  [address] [address]  [place], [address] [place] [address]  Phone: [phone]  Fax :     [phone]     [place] office:  [address] [facility] [address]  Phone - [phone]  Fax - [phone]

## 2021-12-20 NOTE — ED PROVIDER NOTES
The patient is a 55-year-old male with a past medical history significant for coronary disease, chronic kidney disease, diabetes, deaf, hypertension, hyperlipidemia, kidney transplant who presents to the ER for evaluation for low blood count according to his nephrologist who called. The patient state that he had blood drawn last week and the hemoglobin was 5.6 he was sent to the ER for further evaluation. The patient admits to feeling dizzy and lightheaded. He is due dialysis today. He was also seen at [facility] when he had the placement of a fistula on December 13, 2021. He denies any headache, sore throat, cough or congestion, chest pain shortness of breath, vomiting, diarrhea, constipation, dysuria, extremity weakness numbness, melena, hematochezia, hematemesis. Past Medical History:   Diagnosis Date    CAD (coronary artery disease)     Status post non-Q wave MI May 2010 with cardiac catheterization and a subsequent CABG with LIMA to LAD and sequential saphenous vein graft to posterolateral branch and PDA by Dr. Khadra Miller.  Chronic kidney disease     Controlled type 1 diabetes mellitus with stage 5 chronic kidney disease not on chronic dialysis ([facility] [address] [facility]     Deaf     Essential hypertension     Hyperlipidemia     Immunocompromised ([facility]     Steroids, Transplant    Kidney replaced by transplant        Past Surgical History:   Procedure Laterality Date    CABG, VEIN, THREE      May 2011    CARDIAC SURG PROCEDURE UNLIST      HX BELOW KNEE AMPUTATION Right 06/2019    HX CORONARY ARTERY BYPASS GRAFT      LIMA to LAD and sequential saphenous vein graft to posterolateral branch and PDA by Dr. Khadra Miller.      HX TRANSPLANT      karena 2013         Family History:   Problem Relation Age of Onset    Cancer Mother     Diabetes Mother     Cancer Father     Heart Disease Other        Social History     Socioeconomic History    Marital status: SINGLE     Spouse name: Not on file    Number of children: Not on file    Years of education: Not on file    Highest education level: Not on file   Occupational History    Not on file   Tobacco Use    Smoking status: Passive Smoke Exposure - Never Smoker    Smokeless tobacco: Never Used   Substance and Sexual Activity    Alcohol use: Yes     Alcohol/week: 4.2 standard drinks     Types: 5 Cans of beer per week    Drug use: No    Sexual activity: Not Currently     Partners: Female   Other Topics Concern    Not on file   Social History Narrative    Not on file     Social Determinants of Health     Financial Resource Strain:     Difficulty of Paying Living Expenses: Not on file   Food Insecurity:     Worried About Running Out of Food in the Last Year: Not on file    Fatuma of Food in the Last Year: Not on file   Transportation Needs:     Lack of Transportation (Medical): Not on file    Lack of Transportation (Non-Medical): Not on file   Physical Activity:     Days of Exercise per Week: Not on file    Minutes of Exercise per Session: Not on file   Stress:     Feeling of Stress : Not on file   Social Connections:     Frequency of Communication with Friends and Family: Not on file    Frequency of Social Gatherings with Friends and Family: Not on file    Attends Restoration Services: Not on file    Active Member of [address] [place] [address] [employer] or Organizations: Not on file    Attends Club or Organization Meetings: Not on file    Marital Status: Not on file   Intimate Partner Violence:     Fear of Current or Ex-Partner: Not on file    Emotionally Abused: Not on file    Physically Abused: Not on file    Sexually Abused: Not on file   Housing Stability:     Unable to Pay for Housing in the Last Year: Not on file    Number of [place] in the Last Year: Not on file    Unstable Housing in the Last Year: Not on file         ALLERGIES: Patient has no known allergies.     Review of Systems    Vitals:    12/20/21 0856   BP: 132/71   Pulse: 92   Resp: 18   Temp: 97.5 °F (36.4 °C)   SpO2: 100%   Weight: 9.979 kg (22 lb)   Height: 5' 11\" (1.803 m)            Physical Exam  Vitals and nursing note reviewed. CONSTITUTIONAL: Well-appearing; well-nourished; in no apparent distress  HEAD: Normocephalic; atraumatic  EYES: PERRL; EOM intact; conjunctiva and sclera are clear bilaterally. ENT: No rhinorrhea; normal pharynx with no tonsillar hypertrophy; mucous membranes pink/moist, no erythema, no exudate. NECK: Supple; non-tender; no cervical lymphadenopathy  CARD: Normal S1, S2; no murmurs, rubs, or gallops. Regular rate and rhythm. RESP: Normal respiratory effort; breath sounds clear and equal bilaterally; no wheezes, rhonchi, or rales. ABD: Normal bowel sounds; non-distended; non-tender; no palpable organomegaly, no masses, no bruits. Back Exam: Normal inspection; no vertebral point tenderness, no CVA tenderness. Normal range of motion. EXT: Normal ROM in all four extremities; non-tender to palpation; no swelling or deformity; distal pulses are normal, no edema. SKIN: Warm; dry; Hrebert catheter dialysis catheter placed under chest; dialysis AV fistula placed in the left forearm. NEURO:Alert and oriented x 3, coherent, LEE-XII grossly intact, sensory and motor are non-focal.  ]    MDM  Number of Diagnoses or Management Options  Acute blood loss anemia (ABLA)  End stage renal disease on dialysis [facility])  Diagnosis management comments: Assessment: This is a 51-year-old male who presents to the ER for evaluation for anemia. The patient denies any further discomfort at this time except for lightheadedness and dizziness. He is hemodynamically stable and well. Will need evaluation for anemia, electrolyte abnormality and blood transfusion as needed. Plan: EKG/lab/IV fluid/consult nephrologist and family practice/blood transfusion/serial exam/ Monitor and Reevaluate.          Amount and/or Complexity of Data Reviewed  Clinical lab tests: ordered and reviewed  Tests in the radiology section of CPT®: ordered and reviewed  Tests in the medicine section of CPT®: reviewed  Discussion of test results with the performing providers: yes  Decide to obtain previous medical records or to obtain history from someone other than the patient: yes  Obtain history from someone other than the patient: yes  Review and summarize past medical records: yes  Discuss the patient with other providers: yes  Independent visualization of images, tracings, or specimens: yes    Risk of Complications, Morbidity, and/or Mortality  Presenting problems: moderate  Diagnostic procedures: moderate  Management options: moderate    Patient Progress  Patient progress: stable    ED Course as of 12/20/21 0939   Mon Dec 20, 2021   Sadaf Presents  [MW]   9673 Presents to ED for abnormal labs. Hx ESRD, on dialysis MWF, blood work was performed on Friday showing low hemoglobin 5.9. Pt endorses dizziness. Denies any pain or abnormal bleeding.  used. 8:57 AM  I have evaluated the patient as the Provider in Triage. I have reviewed His vital signs and the triage nurse assessment. I have talked with the patient and any available family and advised that I am the provider in triage and have ordered the appropriate study to initiate their work up based on the clinical presentation during my assessment. I have advised that the patient will be accommodated in the Main ED as soon as possible. I have also requested to contact the triage nurse or myself immediately if the patient experiences any changes in their condition during this brief waiting period. Brandon Hartman [MW]      ED Course User Index  [MW] [place], [place]       Procedures    ED EKG interpretation:  Rhythm: normal sinus rhythm; and regular . Rate (approx.): 85; Axis: normal; P wave: prolonged; QRS interval: prolonged; ST/T wave: non-specific changes; in  Lead: Diffusely; Other findings: abnormal ekg.  This EKG was interpreted by Deborah Cristina MD,ED Provider. PROGRESS NOTE:  Pt has been reexamined by Lisa Hunt MD all available results have been reviewed with pt and any available family. Pt understands sx, dx, and tx in ED. Care plan has been outlined and questions have been answered. Pt and any available family understands and agrees to need for admission to hospital for further tx not available in ED. Pt is ready for admission. Written by Deborah Cristina MD,  9:47 AM    CONSULT NOTE:  Lisa Hunt MD spoke with Dr. Lexa Garza. Discussed patient's presentation, history, physical assessment, and available diagnostic results. Will come and see the patient in the ED. I have discussed with the patient the rationale for blood component transfusion; its benefits in treating or preventing fatigue, organ damage, or death; and its risk which includes mild transfusion reactions, rare risk of blood borne infection, or more serious but rare reactions. I have discussed the alternatives to transfusion, including the risk and consequences of not receiving transfusion. The patient had an opportunity to ask questions and had agreed to proceed with transfusion of blood components. Perfect Serve Consult for Admission  10:02 AM    ED Room Number: CW/CW  Patient Name and age:  Neelam Goyal 64 y.o.  male  Working Diagnosis:   1. Acute blood loss anemia (ABLA)    2. End stage renal disease on dialysis ([facility] [address]        COVID-19 Suspicion:  no  Sepsis present:  no  Reassessment needed: no  Code Status:  Full Code  Readmission: no  Isolation Requirements:  no  Recommended Level of Care:  telemetry  Department:  [address] [place] [address] ED - [phone]    Other: The patient is deaf and mute and needs . He is due for dialysis today and nephrology has been notified    Total critical care time spent exclusive of procedures:  45 minutes. Alethea Nunezing

## 2021-12-20 NOTE — ED TRIAGE NOTES
Patient arrived with c/o dizziness and recent blood work showing \"kidney problems. \"     Patient is ESRD on dialysis M/W/F      Albina Castillo #[id number] used in triage

## 2021-12-20 NOTE — PROGRESS NOTES
PHYSICAL THERAPY EVALUATION/DISCHARGE  Patient: Mildred Terrazas (57 y.o. male)  Date: 12/20/2021  Primary Diagnosis: Acute blood loss anemia [D62]  End stage renal disease on dialysis ([facility] [address] [N18.6, Z99.2]  Anemia [D64.9]       Precautions:    (deaf; Matthias Cypher; has prosthesis)      ASSESSMENT  Based on the objective data described below, the patient presents with baseline functional strength, ROM, balance, transfers and gait following admission for acute anemia. Patient came to ED today with dizziness and Hgb of 5.7. He has had dialysis and 2 units of blood. Patient has history of CAD, CABG,  DM, kidney transplant, ESRD, and right BKA (4 years ago). He gets dialysis MWF at baseline. Patient is also deaf and we communicated in writing due to malfunction of [employer] language line. Patient was alert, oriented, and able to stand and ambulate 100 feet with SBA. BP taken with changes of position and was stable (see doc flow sheets). No dizziness reported during ambulation. No loss of balance. He is safe for discharge from a PT standpoint and has no PT needs at this time. Functional Outcome Measure: The patient scored 95/100 on the Barthel outcome measure. Other factors to consider for discharge: none     Further skilled acute physical therapy is not indicated at this time. PLAN :  Recommendation for discharge: (in order for the patient to meet his/her long term goals)  No skilled physical therapy/ follow up rehabilitation needs identified at this time. This discharge recommendation:  Has not yet been discussed the attending provider and/or case management    IF patient discharges home will need the following DME: patient owns DME required for discharge       SUBJECTIVE:   Patient stated I can walk; I don't need the walker.     OBJECTIVE DATA SUMMARY:   HISTORY:    Past Medical History:   Diagnosis Date    Anemia due to chronic kidney disease     CAD (coronary artery disease)     Status post non-Q wave MI May 2010 with cardiac catheterization and a subsequent CABG with LIMA to LAD and sequential saphenous vein graft to posterolateral branch and PDA by Dr. Libia Lance.  Deaf     DM type 2 causing renal disease ([facility] [address]     [facility]D [facility] dialysis ([facility]     GERD (gastroesophageal reflux disease)     Gout     HTN (hypertension)     Hyperlipidemia     Kidney replaced by transplant     PAD (peripheral artery disease) ([facility] [address]      Past Surgical History:   Procedure Laterality Date    HX BELOW KNEE AMPUTATION Right 06/2019    HX CORONARY ARTERY BYPASS GRAFT      LIMA to LAD and sequential saphenous vein graft to posterolateral branch and PDA by Dr. Libia Lance.  HX TRANSPLANT      kidnes 2013    IL CABG, VEIN, THREE      May 2011    IL CARDIAC SURG PROCEDURE UNLIST         Prior level of function: independent with prosthetic leg  Personal factors and/or comorbidities impacting plan of care: none    Home Situation  Home Environment: Private residence  # Steps to Enter: 0  One/Two Story Residence: One story  Living Alone: No  Support Systems: Spouse/Significant Other  Patient Expects to be Discharged to[de-identified] [employer]  Current DME Used/Available at Home: Wheelchair (RLE prosthesis)    EXAMINATION/PRESENTATION/DECISION MAKING:   Critical Behavior:  Neurologic State: Alert  Orientation Level: Oriented X4     Safety/Judgement: Awareness of environment,Good awareness of safety precautions       Skin:  Not fully observed    Range Of Motion:  AROM: Within functional limits                       Strength:    Strength:  Within functional limits                    Tone & Sensation:   Tone: Normal                                   Functional Mobility:  Bed Mobility:     Supine to Sit: Supervision  Sit to Supine: Supervision     Transfers:  Sit to Stand: Supervision  Stand to Sit: Supervision                       Balance:   Sitting: Intact  Standing: Intact  Ambulation/Gait Training:  Distance (ft): 100 Feet (ft)  Assistive Device: Gait belt;Prosthetic device  Ambulation - Level of Assistance: Stand-by assistance                                                        Functional Measure:  Barthel Index:    Bathin  Bladder: 10  Bowels: 10  Groomin  Dressing: 10  Feeding: 10  Mobility: 15  Stairs: 5  Toilet Use: 10  Transfer (Bed to Chair and Back): 15  Total: 95/100       The Barthel ADL Index: Guidelines  1. The index should be used as a record of what a patient does, not as a record of what a patient could do. 2. The main aim is to establish degree of independence from any help, physical or verbal, however minor and for whatever reason. 3. The need for supervision renders the patient not independent. 4. A patient's performance should be established using the best available evidence. Asking the patient, friends/relatives and nurses are the usual sources, but direct observation and common sense are also important. However direct testing is not needed. 5. Usually the patient's performance over the preceding 24-48 hours is important, but occasionally longer periods will be relevant. 6. Middle categories imply that the patient supplies over 50 per cent of the effort. 7. Use of aids to be independent is allowed. Score Interpretation (from [address] [address])    Independent   60-79 Minimally independent   40-59 Partially dependent   20-39 Very dependent   <20 Totally dependent     -Jourdan Anaya., Barthel, D.W. (1965). Functional evaluation: the Barthel Index. [facility] ([address] (1997). The Barthel activities of daily living index: self-reporting versus actual performance in the old (> or = 75 years). Journal of [address] [place] [address] 45(7), 14 [facility], ..., Kendrick Bio., Gailen Den[employer]ational. (). Measuring the change in disability after inpatient rehabilitation; comparison of the responsiveness of the Barthel Index and Functional [place] Measure.  Journal of Neurology, Neurosurgery, and Psychiatry, 664), 282-823. Nishi JEREMÍAS Perez, JOELLEN Franklin, & Osvaldo Johnson M.A. (2004) Assessment of post-stroke quality of life in cost-effectiveness studies: The usefulness of the Barthel Index and the EuroQoL-5D. Quality of Life Research, 15, 048-48          Physical Therapy Evaluation Charge Determination   History Examination Presentation Decision-Making   MEDIUM  Complexity : 1-2 comorbidities / personal factors will impact the outcome/ POC  LOW Complexity : 1-2 Standardized tests and measures addressing body structure, function, activity limitation and / or participation in recreation  LOW Complexity : Stable, uncomplicated  Other outcome measures Barthel  LOW       Based on the above components, the patient evaluation is determined to be of the following complexity level: LOW     Pain Rating:  None reported    Activity Tolerance:   Good      After treatment patient left in no apparent distress:   Supine in bed and Call bell within reach    COMMUNICATION/EDUCATION:   The patients plan of care was discussed with: Registered nurse. Fall prevention education was provided and the patient/caregiver indicated understanding. and Patient/family agree to work toward stated goals and plan of care.     Thank you for this referral.  Marcille Curling, PT   Time Calculation: 20 mins

## 2021-12-21 VITALS
DIASTOLIC BLOOD PRESSURE: 84 MMHG | RESPIRATION RATE: 16 BRPM | HEART RATE: 80 BPM | BODY MASS INDEX: 3.08 KG/M2 | SYSTOLIC BLOOD PRESSURE: 163 MMHG | OXYGEN SATURATION: 98 % | WEIGHT: 22 LBS | HEIGHT: 71 IN | TEMPERATURE: 98.7 F

## 2021-12-21 LAB
ALBUMIN SERPL-MCNC: 2.6 G/DL (ref 3.5–5)
ALBUMIN/GLOB SERPL: 0.7 {RATIO} (ref 1.1–2.2)
ALP SERPL-CCNC: 78 U/L (ref 45–117)
ALT SERPL-CCNC: <6 U/L (ref 12–78)
AMPHET UR QL SCN: NEGATIVE
ANION GAP SERPL CALC-SCNC: 6 MMOL/L (ref 5–15)
AST SERPL-CCNC: 21 U/L (ref 15–37)
BARBITURATES UR QL SCN: NEGATIVE
BENZODIAZ UR QL: POSITIVE
BILIRUB SERPL-MCNC: 0.6 MG/DL (ref 0.2–1)
BUN SERPL-MCNC: 19 MG/DL (ref 6–20)
BUN/CREAT SERPL: 4 (ref 12–20)
CALCIUM SERPL-MCNC: 8.1 MG/DL (ref 8.5–10.1)
CANNABINOIDS UR QL SCN: NEGATIVE
CHLORIDE SERPL-SCNC: 103 MMOL/L (ref 97–108)
CO2 SERPL-SCNC: 30 MMOL/L (ref 21–32)
COCAINE UR QL SCN: NEGATIVE
CREAT SERPL-MCNC: 4.79 MG/DL (ref 0.7–1.3)
DRUG SCRN COMMENT,DRGCM: ABNORMAL
ERYTHROCYTE [DISTWIDTH] IN BLOOD BY AUTOMATED COUNT: 17 % (ref 11.5–14.5)
GLOBULIN SER CALC-MCNC: 3.8 G/DL (ref 2–4)
GLUCOSE BLD STRIP.AUTO-MCNC: 130 MG/DL (ref 65–117)
GLUCOSE BLD STRIP.AUTO-MCNC: 80 MG/DL (ref 65–117)
GLUCOSE SERPL-MCNC: 71 MG/DL (ref 65–100)
HCT VFR BLD AUTO: 22.1 % (ref 36.6–50.3)
HGB BLD-MCNC: 6.6 G/DL (ref 12.1–17)
HISTORY CHECKED?,CKHIST: NORMAL
MAGNESIUM SERPL-MCNC: 2 MG/DL (ref 1.6–2.4)
MCH RBC QN AUTO: 27.7 PG (ref 26–34)
MCHC RBC AUTO-ENTMCNC: 29.9 G/DL (ref 30–36.5)
MCV RBC AUTO: 92.9 FL (ref 80–99)
METHADONE UR QL: NEGATIVE
NRBC # BLD: 0 K/UL (ref 0–0.01)
NRBC BLD-RTO: 0 PER 100 WBC
OPIATES UR QL: NEGATIVE
PCP UR QL: NEGATIVE
PLATELET # BLD AUTO: 203 K/UL (ref 150–400)
PMV BLD AUTO: 11.6 FL (ref 8.9–12.9)
POTASSIUM SERPL-SCNC: 3.6 MMOL/L (ref 3.5–5.1)
PROT SERPL-MCNC: 6.4 G/DL (ref 6.4–8.2)
RBC # BLD AUTO: 2.38 M/UL (ref 4.1–5.7)
SERVICE CMNT-IMP: ABNORMAL
SERVICE CMNT-IMP: NORMAL
SODIUM SERPL-SCNC: 139 MMOL/L (ref 136–145)
UR CULT HOLD, URHOLD: NORMAL
WBC # BLD AUTO: 5 K/UL (ref 4.1–11.1)

## 2021-12-21 PROCEDURE — P9016 RBC LEUKOCYTES REDUCED: HCPCS

## 2021-12-21 PROCEDURE — 83735 ASSAY OF MAGNESIUM: CPT

## 2021-12-21 PROCEDURE — 36430 TRANSFUSION BLD/BLD COMPNT: CPT

## 2021-12-21 PROCEDURE — 99233 SBSQ HOSP IP/OBS HIGH 50: CPT | Performed by: CLINICAL NURSE SPECIALIST

## 2021-12-21 PROCEDURE — 65390000012 HC CONDITION CODE 44 OBSERVATION

## 2021-12-21 PROCEDURE — 74011636637 HC RX REV CODE- 636/637: Performed by: INTERNAL MEDICINE

## 2021-12-21 PROCEDURE — 74011250636 HC RX REV CODE- 250/636: Performed by: INTERNAL MEDICINE

## 2021-12-21 PROCEDURE — 85027 COMPLETE CBC AUTOMATED: CPT

## 2021-12-21 PROCEDURE — 74011250636 HC RX REV CODE- 250/636: Performed by: EMERGENCY MEDICINE

## 2021-12-21 PROCEDURE — 36415 COLL VENOUS BLD VENIPUNCTURE: CPT

## 2021-12-21 PROCEDURE — 80053 COMPREHEN METABOLIC PANEL: CPT

## 2021-12-21 PROCEDURE — G0378 HOSPITAL OBSERVATION PER HR: HCPCS

## 2021-12-21 PROCEDURE — 74011250637 HC RX REV CODE- 250/637: Performed by: INTERNAL MEDICINE

## 2021-12-21 PROCEDURE — 80307 DRUG TEST PRSMV CHEM ANLYZR: CPT

## 2021-12-21 PROCEDURE — 82962 GLUCOSE BLOOD TEST: CPT

## 2021-12-21 RX ORDER — SODIUM CHLORIDE 9 MG/ML
250 INJECTION, SOLUTION INTRAVENOUS AS NEEDED
Status: DISCONTINUED | OUTPATIENT
Start: 2021-12-21 | End: 2021-12-21 | Stop reason: HOSPADM

## 2021-12-21 RX ADMIN — HYDRALAZINE HYDROCHLORIDE 25 MG: 25 TABLET, FILM COATED ORAL at 09:00

## 2021-12-21 RX ADMIN — NIFEDIPINE 60 MG: 30 TABLET, FILM COATED, EXTENDED RELEASE ORAL at 10:35

## 2021-12-21 RX ADMIN — CARVEDILOL 6.25 MG: 6.25 TABLET, FILM COATED ORAL at 08:00

## 2021-12-21 RX ADMIN — TACROLIMUS 1 MG: 1 CAPSULE ORAL at 10:33

## 2021-12-21 RX ADMIN — MYCOPHENOLATE MOFETIL 500 MG: 250 CAPSULE ORAL at 10:34

## 2021-12-21 RX ADMIN — SODIUM CHLORIDE 250 ML: 9 INJECTION, SOLUTION INTRAVENOUS at 08:42

## 2021-12-21 RX ADMIN — PREDNISONE 2.5 MG: 5 TABLET ORAL at 10:35

## 2021-12-21 RX ADMIN — SODIUM BICARBONATE 1300 MG: 650 TABLET ORAL at 10:34

## 2021-12-21 NOTE — PROGRESS NOTES
CARE MANAGEMENT INITIAL ASSESSEMENT      NAME:   Rachel Abdul   :     1965   MRN:     [id number]       Emergency Contact:  Extended Emergency Contact Information  Primary Emergency Contact: Nathalia Liu  Mobile Phone: [phone]  Relation: Son  Secondary Emergency Contact: [address] Phone: [phone]  Mobile Phone: [phone]  Relation: Spouse    Advance Directive:  Full Code, does not have an advance directive. Healthcare Decision Maker: Ann Price. Spouse is hearing impaired so best contact is Brett Smith at [phone] who can serve as a . Reason for Admission:  Mr. Arielle Wu is a 64 y.o. male with history that includes anemia, DM, CAD, HTN, HLD, GERD and ESRD on dialysis  who was emergently admitted for:  anemia    Patient Active Problem List   Diagnosis Code    Coronary artery disease I25.10    Deafness H91.90    HTN (hypertension) I10    Hx of right BKA ([facility] [address] Z89.511    End stage renal disease on dialysis ([address] N18.6, Z99.2    Anemia due to chronic kidney disease N18.9, D63.1    CAD (coronary artery disease) I25.10    DM type 2 causing renal disease ([address] E11.29    Hyperlipidemia E78.5    GERD (gastroesophageal reflux disease) K21.9    Gout M10.9    Anemia D64.9    Dizziness R42    PAD (peripheral artery disease) ([address] I73.9    Kidney replaced by transplant Z94.0       Assessment: In person with patient. RUR:  11%  Risk Level:  Low  Value-based purchasing:   No  Bundle patient:  No    Residency:  Private residence  Exterior Steps:  None  Interior Steps:  None    Lives With:  Spouse    Prior functioning:  Independent.   Patient requires assistance with:  N/A    Prior DME required:  Wheelchair    DME available:  Wheelchair    Rehab history:  None    Discharge Concerns:  Yes - Describe: no PCP      Insurer:  Payor: Julio Cesar Schmidt / Plan: [place] MEDICARE PART A & B / Product Type: Medicare /     PCP: NONE   Name of Practice: None   Current patient: N/A   Approximate date of last visit: N/A   Access to virtual PCP visits:  N/A    [facility]     COVID-19 vaccination status:  Fully vaccinated in June DC Transport:  Personal      Transition of care plan:  Home with outpatient follow-up     Comments:   Pt admitted on 12/20/21 for anemia. CM met with Pt to complete initial assessment. CM used remote video  for ASL. Pt lives at home with his spouse. Pt has no hx of HH or home O2. Pt uses a w/c for mobility. Pt is independent with w/c transfers and ADLs. Pt denies problems with either. Pt confirms he does not have a PCP. Pt states that he did not like his last PCP and hasn't found another. CM offered [facility] PCP list. Pt is agreeable but would like a PCP office in [place]. CM provided list and circled [facility]. Pt voiced understanding. Discharge plan is for Pt to return home. Pt states he will drive himself home.   _____________________________________  Salli Pack, [address] [place] [address] Drive Management  12/20/2021   9:32 PM      Care Management Interventions  PCP Verified by CM:  Yes (NO PCP)  Mode of Transport at Discharge: Self  Transition of Care Consult (CM Consult): Discharge Planning  MyChart Signup: No  Discharge Durable Medical Equipment: No  Physical Therapy Consult: Yes  Occupational Therapy Consult: Yes  Speech Therapy Consult: No  Support Systems: Spouse/Significant Other,Child(kiah),Other Family Member(s)  Confirm Follow Up Transport: Self  Discharge Location  Discharge Placement: Home with family assistance

## 2021-12-21 NOTE — DIABETES MGMT
[facility] SPECIALIST CONSULT     Initial Presentation   Sharon Patel is a 64 y.o. male sent by nephrologist for evaluation of low blood counts. HGB was 5.7 on arrival. Pt admitted for further intervention and treatment of low HGB    HX:   Past Medical History:   Diagnosis Date    Anemia due to chronic kidney disease     CAD (coronary artery disease)     Status post non-Q wave MI May 2010 with cardiac catheterization and a subsequent CABG with LIMA to LAD and sequential saphenous vein graft to posterolateral branch and PDA by Dr. Renetta Ervin.  Deaf     DM type 2 causing renal disease ([facility] [address] [facility]     [facility]     GERD (gastroesophageal reflux disease)     Gout     HTN (hypertension)     Hyperlipidemia     Kidney replaced by transplant     PAD (peripheral artery disease) ([facility] [facility]         INITIAL DX:   Acute blood loss anemia [D62]  End stage renal disease on dialysis ([facility] [address] [N18.6, Z99.2]  Anemia [D64.9]     Current Treatment     TX: PRBC infusions. dialysis    Consulted by Provider for advanced diabetes nursing assessment and care for:     [x] Home management assessment      Hospital Course   Clinical progress has been uncomplicated    Diabetes History   Pt reports a 28 year history of diabetes that has cause ESRD, he states that he takes his medication daily as directed . Pt last Hgb A1c was 8.9% in 2019. BG on arrival was 89  Diabetes-related Medical History  Acute complications    Neurological complications  Peripheral neuropathy  Microvascular disease  Macrovascular disease  CAD and Peripheral vascular disease  Other associated conditions     anemia    Diabetes Medication History  Key Antihyperglycemic Medications             insulin glargine (LANTUS,BASAGLAR) 100 unit/mL (3 mL) inpn (Taking) 50 Units by SubCUTAneous route daily.     insulin aspart U-100 (NOVOLOG) 100 unit/mL injection (Taking) 5 Units by SubCUTAneous route Before breakfast, lunch, and dinner. Diabetes self-management practices:   Eating pattern pt reports eating well    Physical activity pattern Pt has right BKA limited mobility but does perform daily ADLS and active     Monitoring pattern   [x] Testing BGs 2 times daily    Taking medications pattern  [x] Consistent administration according to patient        Subjective   Obtained through . Pt reports taking medication daily and checking his sugar. He feels he manages his diabetes pretty well  Objective   Physical exam  General Obese male,  Conversant and cooperative  Neuro  Alert, oriented   Vital Signs   Visit Vitals  /77   Pulse 83   Temp 98.7 °F (37.1 °C)   Resp 16   Ht 5' 11\" (1.803 m)   Wt 9.979 kg (22 lb)   SpO2 98%   BMI 3.07 kg/m²     Skin  Warm and dry  Heart   Regular rate and rhythm.  No murmurs, rubs or gallops    Extremities R BKA        Laboratory  Recent Labs     12/21/21  0343 12/20/21  0908   GLU 71 89   AGAP 6 9   WBC 5.0 5.2   CREA 4.79* 6.98*   GFRNA 13* 8*   AST 21 13*   ALT <6* <6*       Factors impacting BG management  Factor Dose Comments   Nutrition:  Standard meals  Tube feeding via OG/NG/PEG  TPN   60 grams/meal      Drugs:  Epogen  Blood transfusion(s)     PRBC being administered,EPO for dialysis  Profound anemia from ESRD Affects A1c   Hgb 5.7 on arrival     Kidney GFR 15  Creatinine 4.79  ALT <6        Blood glucose pattern      Significant diabetes-related events over the past 24-72 hours  BG pattern has been low since arrival     Assessment and Plan   Nursing Diagnosis Risk for unstable blood glucose pattern   Nursing Intervention Domain 7321 Decision-making Support   Nursing Interventions Examined current inpatient diabetes/blood glucose control   Explored factors facilitating and impeding inpatient management  Explored corrective strategies with patient and responsible inpatient provider   Informed patient of rational for insulin strategy while hospitalized       Evaluation   This 56 year old male with ESRD, T2DM. PAD and renal transplantation. Pt arrived for evaluation of low Hgb, which was 5. 7. Pt given PRBC to treat his anemia and will be discharged today or tomorrow   Overall impression  1. A1c is inaccurate in light of his ESRD, anemia, use of epo. 2. If patient reports are correct BG at home are close to target given his overall health status. 3. Why admitted pt has required no insulin with a sub optimal BG pattern of 71-89. Believe this is driven by anemia and renal disease. 4. Would hold lantus administration until FBG are above 100   5. Pre hospital regimen is at appropriate dose, however with minimal BG data points and inaccurate A1c it is difficult to fully assess over all diabetes management         Discharge Recommendations     1. POC ACHS  2. Hold Lantus on discharge until FBG patterns are above 100 and patient is eating. This should assist with possible hypoglycemia  3. 5 Units with meals is appropriate  4. Would have SSI in place if pre prandial BG is elevate    Key Antihyperglycemic Medications             insulin glargine (LANTUS,BASAGLAR) 100 unit/mL (3 mL) inpn (Taking) 50 Units by SubCUTAneous route daily. insulin aspart U-100 (NOVOLOG) 100 unit/mL injection (Taking) 5 Units by SubCUTAneous route Before breakfast, lunch, and dinner.               Billing Code(s)   [x] [id number] IP subsequent hospital care - 35 minutes [] 47218 Prolonged Services - 65 minutes [] 85153 Prolonged Services - 110 minutes  [] 96717 IP subsequent hospital care - 25 minutes [] 34959 Prolonged Services - 55 minutes [] 80931 Prolonged Services - 100 minutes  [] 39080 IP subsequent hospital care - 15 minutes [] 08010 Prolonged Services - 45 minutes [] 55531 Prolonged Services - 90 minutes    Before making these care recommendations, I personally reviewed the hospitalization record, including notes, laboratory & diagnostic data and current medications, and examined the patient at the bedside (circumstances permitting) before making care recommendations.      Total minutes: [address], CNS  Diabetes Clinical Nurse Specialist  [facility]  Access via [address] [place] [address]

## 2021-12-21 NOTE — ROUTINE PROCESS
12/21/2021 3:07 PM   CM received the following information with pt using [employer] interpretor for ASL. Medicare Outpatient Observation Notice (MOON) provided to patient/representative with verbal explanation of the notice. Time allotted for questions regarding the notice. Patient /representative provided a completed copy of the MOON notice. Copy placed on bedside chart. Virtual reviewer communicated change to CM, which reflect outpatient observation order written prior to Written discharge order. Code 44 delivered and given to patient. CM met with the patient and provided to the patient the printed information about her outpatient observation status. The patient was given the flyer entitled, \"Medicare Outpatient Observation: Information & notification. \" All questions were answered, no additional discharge needs identified at this time and patient expects to return to their (home, assisted living facility, relatives home, etc.) after discharge today. Copy provided to patient or sent secure email, secure fax , or certified mail to patient's loved one per their request.  Pt signed copies of both Obs letters. Code 40 letter left with pt as well as Obs letters. Pt confirmed he will transport himself home when ready. DU Rey     Care Management Interventions  PCP Verified by CM:  Yes (NO PCP)  Mode of Transport at Discharge: Self  Transition of Care Consult (CM Consult): Discharge Planning  MyChart Signup: No  Discharge Durable Medical Equipment: No  Physical Therapy Consult: Yes  Occupational Therapy Consult: Yes  Speech Therapy Consult: No  Support Systems: Spouse/Significant Other,Child(kiah),Other Family Member(s)  Confirm Follow Up Transport: Self  Discharge Location  Discharge Placement: Home with family assistance

## 2021-12-21 NOTE — DISCHARGE SUMMARY
Physician Discharge Summary     Patient ID:  Felicia Hooper  [id number]  64 y.o.  1965    Admit date: 12/20/2021    Discharge date of service and time: 12/21/2021    Admission Diagnoses: Acute blood loss anemia [D62]  End stage renal disease on dialysis ([facility] [address] [N18.6, Z99.2]  Anemia [D64.9]    Discharge Diagnoses:    Principal Diagnosis   Anemia                                             Hospital Course and other diagnoses  Anemia, symptomatic / Berniece Se due to chronic kidney disease / Dizziness - POA, severe.  Unclear chronicity or cause.  Unremarkable serologies and hemoccult.  Transfused 3 units.  EPO per renal.      End stage renal disease on dialysis / Hypokalemia / Kidney replaced by transplant - Now back on HD.  Consulted renal.  They did normal Monday HD. Renal diet.  Resume bicarb, tacrolimus, cellecept, prednisone, though I am not sure why these continue when his renal failure has progressed to ESRD     Coronary artery disease s/p CABG / HTN (hypertension) - Resume hydralazine, coreg, nifedipine, pravastatin. Hold ASA until re evaluated     DM type 2 causing vascular and renal disease - Diabetic diet and counseling.  SSI per protocol.  Resume lantus.  A1c useless in setting of anemia      PAD (peripheral artery disease) / Hx of right BKA / Hyperlipidemia - continue statin, hold ASA due to bleeding     Deafness - Supportive care     GERD (gastroesophageal reflux disease) - PPI         PCP: Aubree, MD Stevie    Consults: Nephrology    Significant Diagnostic Studies: See Hospital Course    Discharged home in improved condition.     Discharge Exam:  /77   Pulse 83   Temp 98.7 °F (37.1 °C)   Resp 16   Ht 5' 11\" (1.803 m)   Wt 9.979 kg (22 lb)   SpO2 98%   BMI 3.07 kg/m²      Gen:  Well-developed, well-nourished, in no acute distress  HEENT:  Pink conjunctivae, PERRL, hearing not intact to voice, moist mucous membranes  Neck:  Supple, without masses, thyroid non-tender  Resp:  No accessory muscle use, clear breath sounds without wheezes rales or rhonchi  Card:  No murmurs, normal S1, S2 without thrills, bruits or peripheral edema  Abd:  Soft, non-tender, non-distended, normoactive bowel sounds are present, no mass  Lymph:  No cervical or inguinal adenopathy  Musc:  No cyanosis or clubbing  Skin:  No rashes or ulcers, skin turgor is good  Neuro:  Cranial nerves are grossly intact, mild motor weakness, follows commands appropriately  Psych:  Good insight, oriented to person, place and time, alert    Patient Instructions:   Current Discharge Medication List      CONTINUE these medications which have NOT CHANGED    Details   albuterol (PROVENTIL HFA, VENTOLIN HFA, PROAIR HFA) 90 mcg/actuation inhaler Take 2 Puffs by inhalation every four (4) hours as needed for Wheezing. carvediloL (Coreg) 6.25 mg tablet Take 6.25 mg by mouth two (2) times a day. cholecalciferol (Vitamin D3) (1000 Units /25 mcg) tablet Take 2,000 Units by mouth daily. hydrALAZINE (APRESOLINE) 50 mg tablet Take 25 mg by mouth three (3) times daily. insulin glargine (LANTUS,BASAGLAR) 100 unit/mL (3 mL) inpn 50 Units by SubCUTAneous route daily. insulin aspart U-100 (NOVOLOG) 100 unit/mL injection 5 Units by SubCUTAneous route Before breakfast, lunch, and dinner. NIFEdipine ER (PROCARDIA XL) 60 mg ER tablet Take 60 mg by mouth daily. pravastatin (PRAVACHOL) 80 mg tablet Take 80 mg by mouth nightly.      sodium bicarbonate 650 mg tablet Take 1,300 mg by mouth two (2) times daily (with meals). sodium zirconium cyclosilicate (Lokelma) 10 gram powder packet Take 10 g by mouth daily. tacrolimus (PROGRAF) 1 mg capsule Take 1 mg by mouth two (2) times a day. oxyCODONE IR (ROXICODONE) 5 mg immediate release tablet Take 5 mg by mouth every eight (8) hours as needed for Pain.      mycophenolate (CELLCEPT) 500 mg tablet Take 500 mg by mouth two (2) times a day.  Take 2 tablets by mouth twice a day predniSONE (DELTASONE) 5 mg tablet Take 2.5 mg by mouth daily. Dexlansoprazole (DEXILANT) 60 mg CpDM Take 30 mg by mouth daily (with breakfast). compr.stocking,knee,long,large (COMP STOCKING,KNEE,LONG,LARGE) misc Wear on the right leg for swelling. Goal 20-40 mmhg of pressure. Qty: 1 Package, Refills: 1    Associated Diagnoses: Dependent edema         STOP taking these medications       aspirin 81 mg chewable tablet Comments:   Reason for Stopping:             Activity: Activity as tolerated  Diet: Cardiac Diet, Diabetic Diet, Low fat, Low cholesterol and Renal Diet  Wound Care: None needed    Follow-up with your PCP in a few weeks.   Follow-up tests/labs - none    Signed:  Lissette Logan MD  12/21/2021  11:44 AM

## 2021-12-21 NOTE — ED NOTES
TRANSFER - OUT REPORT:    Verbal report given to AMELIA Lr on Noni Li  [facility]       Report consisted of patients Situation, Background, Assessment and   Recommendations(SBAR). Information from the following report(s) SBAR, ED Summary and MAR was reviewed with the receiving nurse. Lines:   Peripheral IV 12/20/21 Right Antecubital (Active)        Opportunity for questions and clarification was provided.       Patient transported with:   [employer]

## 2021-12-21 NOTE — PROGRESS NOTES
OT order received, chart reviewed, nurse consulted. ADL deficits not identified by nursing, mobility is at baseline per PT. Skilled OT services not indicated.  Completing OT order  Jerel Carmichael, OTR/L

## 2021-12-21 NOTE — DISCHARGE INSTRUCTIONS
Patient Discharge Instructions    Sharon Patel / [id number] : 1965    Admitted 2021 Discharged: 2021     Primary Diagnoses  Problem List as of 2021 Date Reviewed: 2021           End stage renal disease on dialysis [facility])   Anemia due to chronic kidney disease   CAD (coronary artery disease)   DM type 2 causing renal disease ([facility]   Hyperlipidemia   GERD (gastroesophageal reflux disease)   Gout   * (Principal) Anemia   Dizziness   PAD (peripheral artery disease) ([facility]   Kidney replaced by transplant   Hx of right BKA (HCC)   HTN (hypertension)   Deafness   Coronary artery disease          Take Home Medications     · It is important that you take the medication exactly as they are prescribed. · Keep your medication in the bottles provided by the pharmacist and keep a list of the medication names, dosages, and times to be taken in your wallet. · Do not take other medications without consulting your doctor. What to do at Home    Recommended diet: Cardiac Diet, Diabetic Diet and Renal Diet    Recommended activity: Activity as tolerated    If you experience worse symptoms, please follow up with your kidney doctors. Follow-up with your PCP in a few weeks    Follow-up Information     Follow up With Specialties Details Why [id number] [facility] renal clinic  Schedule an appointment as soon as possible for a visit in 1 week             Information obtained by :  I understand that if any problems occur once I am at home I am to contact my physician. I understand and acknowledge receipt of the instructions indicated above.                                                                                                                                            Physician's or R.N.'s Signature                                                                  Date/Time Patient or Representative Signature                                                          Date/Time

## 2021-12-21 NOTE — PROGRESS NOTES
[facility]    Medical Progress Note      NAME: Abel Glover   :  1965  MRM:  [id number]    Date/Time of service 2021  11:37 AM          Assessment and Plan:     Anemia, symptomatic / Hx Anemia due to chronic kidney disease / Dizziness - POA, severe. Unclear chronicity or cause. Unremarkable serologies and hemoccult. Transfused 3 units. EPO per renal.      End stage renal disease on dialysis / Hypokalemia / Kidney replaced by transplant - Now back on HD. Consulted renal.  They did normal Monday HD. Renal diet. Previously on cellecept, prenisone     Coronary artery disease s/p CABG / HTN (hypertension) - Resume meds, previoiusly on hydralazine, metoprolol, norvasc, statin. No ASA for now.     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 SSI per protocol. Continue home insulin if eating well. A1c useless in setting of anemia      PAD (peripheral artery disease) / Hx of right BKA / Hyperlipidemia - continue statin, hold ASA due to bleeding     Deafness - Supportive care     GERD (gastroesophageal reflux disease) - PPI      Gout - Was on allopurinol     BPH - Was on flomax       Subjective:     Chief Complaint:  Feels fine and wants to go home, used ASL     ROS:  (bold if positive, if negative)    Tolerating usual PT  Tolerating Diet        Objective:     Last 24hrs VS reviewed since prior progress note.  Most recent are:    Visit Vitals  /77   Pulse 83   Temp 98.7 °F (37.1 °C)   Resp 16   Ht 5' 11\" (1.803 m)   Wt 9.979 kg (22 lb)   SpO2 98%   BMI 3.07 kg/m²     SpO2 Readings from Last 6 Encounters:   21 98%   19 98%   19 97%   19 98%   19 99%   19 95%            Intake/Output Summary (Last 24 hours) at 2021 1140  Last data filed at 2021 0600  Gross per 24 hour   Intake 620 ml   Output 1950 ml   Net -1330 ml        Physical Exam:    Gen:  Well-developed, well-nourished, in no acute distress  HEENT:  Pink conjunctivae, PERRL, hearing not intact to voice, moist mucous membranes  Neck:  Supple, without masses, thyroid non-tender  Resp:  No accessory muscle use, clear breath sounds without wheezes rales or rhonchi  Card:  No murmurs, normal S1, S2 without thrills, bruits or peripheral edema  Abd:  Soft, non-tender, non-distended, normoactive bowel sounds are present, no mass  Lymph:  No cervical or inguinal adenopathy  Musc:  No cyanosis or clubbing  Skin:  No rashes or ulcers, skin turgor is good  Neuro:  Cranial nerves are grossly intact, mild motor weakness, follows commands appropriately  Psych:  Good insight, oriented to person, place and time, alert    Telemetry reviewed:   normal sinus rhythm  __________________________________________________________________  Medications Reviewed: (see below)  Medications:     Current Facility-Administered Medications   Medication Dose Route Frequency    0.9% sodium chloride infusion 250 mL  250 mL IntraVENous PRN    0.9% sodium chloride infusion 250 mL  250 mL IntraVENous PRN    glucose chewable tablet 16 g  4 Tablet Oral PRN    dextrose (D50W) injection syrg 12.5-25 g  25-50 mL IntraVENous PRN    glucagon (GLUCAGEN) injection 1 mg  1 mg IntraMUSCular PRN    sodium chloride (NS) flush 5-40 mL  5-40 mL IntraVENous Q8H    sodium chloride (NS) flush 5-40 mL  5-40 mL IntraVENous PRN    acetaminophen (TYLENOL) tablet 650 mg  650 mg Oral Q6H PRN    Or    acetaminophen (TYLENOL) suppository 650 mg  650 mg Rectal Q6H PRN    polyethylene glycol (MIRALAX) packet 17 g  17 g Oral DAILY PRN    ondansetron (ZOFRAN ODT) tablet 4 mg  4 mg Oral Q8H PRN    Or    ondansetron (ZOFRAN) injection 4 mg  4 mg IntraVENous Q6H PRN    insulin lispro (HUMALOG) injection   SubCUTAneous AC&HS    pantoprazole (PROTONIX) 40 mg in 0.9% sodium chloride 10 mL injection  40 mg IntraVENous Q12H    0.9% sodium chloride infusion 250 mL  250 mL IntraVENous PRN    carvediloL (COREG) tablet 6.25 mg  6.25 mg Oral BID WITH MEALS    hydrALAZINE (APRESOLINE) tablet 25 mg  25 mg Oral TID    insulin glargine (LANTUS) injection 50 Units  50 Units SubCUTAneous DAILY    mycophenolate mofetil (CELLCEPT) capsule 500 mg  500 mg Oral ACB&D    NIFEdipine ER (PROCARDIA XL) tablet 60 mg  60 mg Oral DAILY    pravastatin (PRAVACHOL) tablet 80 mg  80 mg Oral QHS    predniSONE (DELTASONE) tablet 2.5 mg  2.5 mg Oral DAILY WITH BREAKFAST    sodium bicarbonate tablet 1,300 mg  1,300 mg Oral BID WITH MEALS    tacrolimus (PROGRAF) capsule 1 mg  1 mg Oral ACB&D        Lab Data Reviewed: (see below)  Lab Review:     Recent Labs     12/21/21  0343 12/20/21  1731 12/20/21  0908   WBC 5.0  --  5.2   HGB 6.6* 7.4* 5.7*   HCT 22.1* 24.2* 19.8*     --  197     Recent Labs     12/21/21  0343 12/20/21  1213 12/20/21  0908     --  139   K 3.6  --  3.2*     --  102   CO2 30  --  28   GLU 71  --  89   BUN 19  --  34*   CREA 4.79*  --  6.98*   CA 8.1*  --  8.9   MG 2.0  --  1.8   PHOS  --   --  3.2   ALB 2.6*  --  3.0*   TBILI 0.6  --  0.4   ALT <6*  --  <6*   INR  --  1.1  --      Lab Results   Component Value Date/Time    Glucose (POC) 80 12/21/2021 06:17 AM    Glucose (POC) 84 12/20/2021 09:39 PM    Glucose (POC) 80 12/20/2021 05:36 PM    Glucose (POC) 245 (H) 08/15/2013 04:42 PM    Glucose (POC) 279 (H) 08/15/2013 01:24 PM     No results for input(s): PH, PCO2, PO2, HCO3, FIO2 in the last 72 hours. Recent Labs     12/20/21  1213   INR 1.1     All Micro Results     Procedure Component Value Units Date/Time    URINE CULTURE HOLD SAMPLE [[id number]] Collected: 12/21/21 0838    Order Status: Completed Specimen: Serum Updated: 12/21/21 0851     Urine culture hold       Urine on hold in Microbiology dept for 2 days. If unpreserved urine is submitted, it cannot be used for addtional testing after 24 hours, recollection will be required.                 Other pertinent lab: none    Total time spent with patient: 27 Minutes I personally reviewed chart, notes, data and current medications in the medical record. I have personally examined and treated the patient at bedside during this period.                  Care Plan discussed with: Patient, Family, Care Manager, Nursing Staff, Consultant/Specialist and >50% of time spent in counseling and coordination of care    Discussed:  Care Plan and D/C Planning    Prophylaxis:  H2B/PPI    Disposition:  Home w/Family           ___________________________________________________    Attending Physician: Silva Hannah MD

## 2021-12-22 LAB
ABO + RH BLD: NORMAL
BLD PROD TYP BPU: NORMAL
BLOOD GROUP ANTIBODIES SERPL: NORMAL
BPU ID: NORMAL
CROSSMATCH RESULT,%XM: NORMAL
SPECIMEN EXP DATE BLD: NORMAL
STATUS OF UNIT,%ST: NORMAL
UNIT DIVISION, %UDIV: 0

## 2022-01-17 ENCOUNTER — TELEPHONE (OUTPATIENT)
Dept: FAMILY MEDICINE CLINIC | Age: 57
End: 2022-01-17

## 2022-02-15 ENCOUNTER — OFFICE VISIT (OUTPATIENT)
Dept: FAMILY MEDICINE CLINIC | Age: 57
End: 2022-02-15
Payer: MEDICARE

## 2022-02-15 VITALS
OXYGEN SATURATION: 99 % | HEIGHT: 71 IN | BODY MASS INDEX: 29.68 KG/M2 | DIASTOLIC BLOOD PRESSURE: 92 MMHG | RESPIRATION RATE: 16 BRPM | SYSTOLIC BLOOD PRESSURE: 159 MMHG | TEMPERATURE: 98.8 F | HEART RATE: 90 BPM | WEIGHT: 212 LBS

## 2022-02-15 DIAGNOSIS — Z99.2 TYPE 2 DIABETES MELLITUS WITH CHRONIC KIDNEY DISEASE ON CHRONIC DIALYSIS, WITH LONG-TERM CURRENT USE OF INSULIN (HCC): Primary | ICD-10-CM

## 2022-02-15 DIAGNOSIS — G56.02 CARPAL TUNNEL SYNDROME OF LEFT WRIST: ICD-10-CM

## 2022-02-15 DIAGNOSIS — Z79.4 TYPE 2 DIABETES MELLITUS WITH CHRONIC KIDNEY DISEASE ON CHRONIC DIALYSIS, WITH LONG-TERM CURRENT USE OF INSULIN (HCC): Primary | ICD-10-CM

## 2022-02-15 DIAGNOSIS — N18.6 TYPE 2 DIABETES MELLITUS WITH CHRONIC KIDNEY DISEASE ON CHRONIC DIALYSIS, WITH LONG-TERM CURRENT USE OF INSULIN (HCC): Primary | ICD-10-CM

## 2022-02-15 DIAGNOSIS — E11.22 TYPE 2 DIABETES MELLITUS WITH CHRONIC KIDNEY DISEASE ON CHRONIC DIALYSIS, WITH LONG-TERM CURRENT USE OF INSULIN (HCC): Primary | ICD-10-CM

## 2022-02-15 DIAGNOSIS — D63.1 ANEMIA DUE TO CHRONIC KIDNEY DISEASE, ON CHRONIC DIALYSIS (HCC): ICD-10-CM

## 2022-02-15 DIAGNOSIS — Z89.511 HX OF RIGHT BKA (HCC): ICD-10-CM

## 2022-02-15 DIAGNOSIS — N18.6 ANEMIA DUE TO CHRONIC KIDNEY DISEASE, ON CHRONIC DIALYSIS (HCC): ICD-10-CM

## 2022-02-15 DIAGNOSIS — Z99.2 ANEMIA DUE TO CHRONIC KIDNEY DISEASE, ON CHRONIC DIALYSIS (HCC): ICD-10-CM

## 2022-02-15 PROCEDURE — G8432 DEP SCR NOT DOC, RNG: HCPCS | Performed by: STUDENT IN AN ORGANIZED HEALTH CARE EDUCATION/TRAINING PROGRAM

## 2022-02-15 PROCEDURE — G8419 CALC BMI OUT NRM PARAM NOF/U: HCPCS | Performed by: STUDENT IN AN ORGANIZED HEALTH CARE EDUCATION/TRAINING PROGRAM

## 2022-02-15 PROCEDURE — G9231 DOC ESRD DIA TRANS PREG: HCPCS | Performed by: STUDENT IN AN ORGANIZED HEALTH CARE EDUCATION/TRAINING PROGRAM

## 2022-02-15 PROCEDURE — 3017F COLORECTAL CA SCREEN DOC REV: CPT | Performed by: STUDENT IN AN ORGANIZED HEALTH CARE EDUCATION/TRAINING PROGRAM

## 2022-02-15 PROCEDURE — G8428 CUR MEDS NOT DOCUMENT: HCPCS | Performed by: STUDENT IN AN ORGANIZED HEALTH CARE EDUCATION/TRAINING PROGRAM

## 2022-02-15 PROCEDURE — 2022F DILAT RTA XM EVC RTNOPTHY: CPT | Performed by: STUDENT IN AN ORGANIZED HEALTH CARE EDUCATION/TRAINING PROGRAM

## 2022-02-15 PROCEDURE — 3044F HG A1C LEVEL LT 7.0%: CPT | Performed by: STUDENT IN AN ORGANIZED HEALTH CARE EDUCATION/TRAINING PROGRAM

## 2022-02-15 PROCEDURE — 99213 OFFICE O/P EST LOW 20 MIN: CPT | Performed by: STUDENT IN AN ORGANIZED HEALTH CARE EDUCATION/TRAINING PROGRAM

## 2022-02-15 RX ORDER — INSULIN ASPART 100 [IU]/ML
5 INJECTION, SOLUTION INTRAVENOUS; SUBCUTANEOUS
Qty: 10 ML | Status: CANCELLED | OUTPATIENT
Start: 2022-02-15

## 2022-02-15 RX ORDER — INSULIN ASPART 100 [IU]/ML
5 INJECTION, SOLUTION INTRAVENOUS; SUBCUTANEOUS
Qty: 15 ML | Refills: 3 | Status: SHIPPED | OUTPATIENT
Start: 2022-02-15

## 2022-02-15 RX ORDER — INSULIN GLARGINE 100 [IU]/ML
50 INJECTION, SOLUTION SUBCUTANEOUS DAILY
Qty: 15 ML | Refills: 5 | Status: SHIPPED | OUTPATIENT
Start: 2022-02-15

## 2022-02-15 RX ORDER — PEN NEEDLE, DIABETIC 30 GX3/16"
NEEDLE, DISPOSABLE MISCELLANEOUS
Qty: 200 PEN NEEDLE | Refills: 11 | Status: SHIPPED | OUTPATIENT
Start: 2022-02-15

## 2022-02-15 NOTE — PROGRESS NOTES
: [id number]        Amarilis Samuel (: 1965) is a 64 y.o. male, established patient, here for evaluation of the following chief complaint(s):  Diabetes and Hospital Follow Up       Assessment/Plan:  1. Type 2 diabetes mellitus with chronic kidney disease on chronic dialysis, with long-term current use of insulin ([facility]- Pt not actively seeing any other providers managing his diabetes. As we do not have any recent history on his blood sugar control we will get an A1c today. Refill of Lantus and NovoLog given in the meantime. I will adjust this as his A1c returns. -     insulin glargine (LANTUS,BASAGLAR) 100 unit/mL (3 mL) inpn; 50 Units by SubCUTAneous route daily. , Normal, Disp-15 mL, R-5  -     METABOLIC PANEL, COMPREHENSIVE; Future  -     HEMOGLOBIN A1C WITH EAG; Future  -     LIPID PANEL; Future  -     insulin aspart U-100 (NOVOLOG) 100 unit/mL (3 mL) inpn; 5 Units by SubCUTAneous route Before breakfast, lunch, and dinner., Normal, Disp-15 mL, R-3  -     Insulin Needles, Disposable, 31 gauge x 5/16\" ndle; Use with insulin as directed, Normal, Disp-200 Pen Needle, R-11  2. Anemia due to chronic kidney disease, on chronic dialysis (HCC)-patient required transfusion 2 months ago. Dialysis center states that it has not been that low recently but he does not know of actual number. Will check hemoglobin level today. -     CBC W/O DIFF; Future  3. Hx of right BKA ([facility])-updated charting that BKA was 2 years ago due to crush injury as well as superimposed infection. 4. Carpal tunnel syndrome of left wrist-symptoms present since fistula surgery 2 months ago. Symptoms consistent with a median nerve distribution. He would be unable to tolerate a wrist splint due to his sign language usage. I emphasized we could do a steroid injection in our clinic vs sending to orthopedics.   Patient prefers to wait until he sees vascular surgery again regarding his fistula before treating the left wrist.      Return in about 3 months (around 5/15/2022). Subjective/Objective:  HPI  History obtained through . CKD 5- Dialysis MWF, started back up 2-3 months ago as renal functioning was recently worsening. Transplant was in 2013. Still seeing [facility] nephrology. DM- Unclear who was previously refilling medications as we do not have this refills in our system but pt states was getting it from here. I see a refill sent in by nephrology but after calling the pharmacy, they do not have this. - Pt is unsure of any recent A1c or POC checks. Denies any neuropathy. Pt believes his CKD is independent of his T2DM and more so from HTN. Physical Exam  Blood pressure (!) 159/92, pulse 90, temperature 98.8 °F (37.1 °C), temperature source Oral, resp. rate 16, height 5' 11\" (1.803 m), weight 212 lb (96.2 kg), SpO2 99 %. Body mass index is 29.57 kg/m². General appearance - Alert, NAD. Head - Atraumatic. Normocephalic. No lymphadenopathy  Eyes - EOMI. Sclera white. Ears - Hearing grossly normal.    Nose - Nares patent, no polyps  Throat - pharynx clear, no exudates. Respiratory - LCTAB. No wheeze/rale/rhonchi  Heart - Normal rate, regular rhythm. No m/r/r  Abdomen - Soft, non tender. Non distended. Neurological - No focal deficits. Speech normal.   Musculoskeletal -Tinel's sign positive on left wrist.   Extremities - R BKA, sensation intact to L LE   Skin - normal coloration and normal turgor. No cyanosis, no rash. Medical History- Reviewed Social History- Reviewed Surgical History- Reviewed   Jennifer Never has a past medical history of Anemia due to chronic kidney disease, CAD (coronary artery disease), Deaf, DM type 2 causing renal disease ([address] ESRD on dialysis ([address] GERD (gastroesophageal reflux disease), Gout, HTN (hypertension), Hyperlipidemia, Kidney replaced by transplant, and PAD (peripheral artery disease) ([address] Jennifer Never reports that he is a non-smoker but has been exposed to tobacco smoke.  He has never used smokeless tobacco. He reports current alcohol use of about 4.2 standard drinks of alcohol per week. He reports that he does not use drugs. Abdi Donohue has a past surgical history that includes pr cabg, vein, three; hx coronary artery bypass graft; pr cardiac surg procedure unlist; hx transplant; and hx below knee amputation (Right, 06/2019). Problem List- Reviewed   Abdi Donohue has Coronary artery disease, Deafness, HTN (hypertension), Hx of right BKA ([facility] End stage renal disease on dialysis ([facility] Anemia due to chronic kidney disease, CAD (coronary artery disease), DM type 2 causing renal disease ([facility] Hyperlipidemia, GERD (gastroesophageal reflux disease), Gout, Anemia, Dizziness, PAD (peripheral artery disease) ([facility] and Kidney replaced by transplant on their problem list.     Current Outpatient Medications   Medication Instructions    albuterol (PROVENTIL HFA, VENTOLIN HFA, PROAIR HFA) 90 mcg/actuation inhaler 2 Puffs, Inhalation, EVERY 4 HOURS AS NEEDED    carvediloL (COREG) 6.25 mg, Oral, 2 TIMES DAILY    cholecalciferol (VITAMIN D3) 2,000 Units, Oral, DAILY    compr.stocking,knee,long,large (COMP STOCKING,KNEE,LONG,LARGE) misc Wear on the right leg for swelling. Goal 20-40 mmhg of pressure.     dexlansoprazole (DEXILANT) 30 mg, DAILY WITH BREAKFAST    hydrALAZINE (APRESOLINE) 25 mg, Oral, 3 TIMES DAILY    insulin aspart U-100 (NOVOLOG) 5 Units, SubCUTAneous, 3 TIMES DAILY BEFORE MEALS    insulin glargine (LANTUS,BASAGLAR) 50 Units, SubCUTAneous, DAILY    Insulin Needles, Disposable, 31 gauge x 5/16\" ndle Use with insulin as directed    Lokelma 10 g, Oral, DAILY    mycophenolate (CELLCEPT) 500 mg, Oral, 2 TIMES DAILY, Take 2 tablets by mouth twice a day    NIFEdipine ER (PROCARDIA XL) 60 mg, Oral, DAILY    oxyCODONE IR (ROXICODONE) 5 mg, Oral, EVERY 8 HOURS AS NEEDED    pravastatin (PRAVACHOL) 80 mg, Oral, EVERY BEDTIME    predniSONE (DELTASONE) 2.5 mg, Oral, DAILY    sodium bicarbonate 1,300 mg, Oral, 2 TIMES DAILY WITH MEALS    tacrolimus (PROGRAF) 1 mg, Oral, 2 TIMES DAILY           An electronic signature was used to authenticate this note.   -- Sheryl Dover MD

## 2022-02-15 NOTE — PROGRESS NOTES
1. Have you been to the ER, urgent care clinic since your last visit? Hospitalized since your last visit? Yes When: [facility]     2. Have you seen or consulted any other health care providers outside of the [address] [place] [address] since your last visit? Include any pap smears or colon screening. No    Reviewed record in preparation for visit and have necessary documentation  Goals that were addressed and/or need to be completed during or after this appointment include     Health Maintenance Due   Topic Date Due    Depression Screen  Never done    DTaP/Tdap/Td series (1 - Tdap) Never done    Colorectal Cancer Screening Combo  Never done    Shingrix Vaccine Age 50> (1 of 2) Never done    Medicare Yearly Exam  Never done    Pneumococcal 0-64 years (3 of 4 - PPSV23) 10/30/2019    Foot Exam Q1  04/15/2020    A1C test (Diabetic or Prediabetic)  04/16/2020    MICROALBUMIN Q1  04/16/2020    Lipid Screen  04/16/2020    Eye Exam Retinal or Dilated  02/14/2021       Patient is accompanied by self I have received verbal consent from Harish Carnes to discuss any/all medical information while they are present in the room.

## 2022-02-16 ENCOUNTER — TELEPHONE (OUTPATIENT)
Dept: FAMILY MEDICINE CLINIC | Age: 57
End: 2022-02-16

## 2022-02-16 LAB
ALBUMIN SERPL-MCNC: 3.6 G/DL (ref 3.5–5)
ALBUMIN/GLOB SERPL: 0.9 {RATIO} (ref 1.1–2.2)
ALP SERPL-CCNC: 94 U/L (ref 45–117)
ALT SERPL-CCNC: 12 U/L (ref 12–78)
ANION GAP SERPL CALC-SCNC: 4 MMOL/L (ref 5–15)
AST SERPL-CCNC: 16 U/L (ref 15–37)
BILIRUB SERPL-MCNC: 0.6 MG/DL (ref 0.2–1)
BUN SERPL-MCNC: 31 MG/DL (ref 6–20)
BUN/CREAT SERPL: 5 (ref 12–20)
CALCIUM SERPL-MCNC: 8.5 MG/DL (ref 8.5–10.1)
CHLORIDE SERPL-SCNC: 104 MMOL/L (ref 97–108)
CHOLEST SERPL-MCNC: 226 MG/DL
CO2 SERPL-SCNC: 32 MMOL/L (ref 21–32)
CREAT SERPL-MCNC: 5.85 MG/DL (ref 0.7–1.3)
ERYTHROCYTE [DISTWIDTH] IN BLOOD BY AUTOMATED COUNT: 15.1 % (ref 11.5–14.5)
EST. AVERAGE GLUCOSE BLD GHB EST-MCNC: 108 MG/DL
GLOBULIN SER CALC-MCNC: 4 G/DL (ref 2–4)
GLUCOSE SERPL-MCNC: 56 MG/DL (ref 65–100)
HBA1C MFR BLD: 5.4 % (ref 4–5.6)
HCT VFR BLD AUTO: 38.2 % (ref 36.6–50.3)
HDLC SERPL-MCNC: 35 MG/DL
HDLC SERPL: 6.5 {RATIO} (ref 0–5)
HGB BLD-MCNC: 10.8 G/DL (ref 12.1–17)
LDLC SERPL CALC-MCNC: 153.6 MG/DL (ref 0–100)
MCH RBC QN AUTO: 28.4 PG (ref 26–34)
MCHC RBC AUTO-ENTMCNC: 28.3 G/DL (ref 30–36.5)
MCV RBC AUTO: 100.5 FL (ref 80–99)
NRBC # BLD: 0 K/UL (ref 0–0.01)
NRBC BLD-RTO: 0 PER 100 WBC
PLATELET # BLD AUTO: 196 K/UL (ref 150–400)
PMV BLD AUTO: 10.9 FL (ref 8.9–12.9)
POTASSIUM SERPL-SCNC: 4.4 MMOL/L (ref 3.5–5.1)
PROT SERPL-MCNC: 7.6 G/DL (ref 6.4–8.2)
RBC # BLD AUTO: 3.8 M/UL (ref 4.1–5.7)
SODIUM SERPL-SCNC: 140 MMOL/L (ref 136–145)
TRIGL SERPL-MCNC: 187 MG/DL (ref ?–150)
VLDLC SERPL CALC-MCNC: 37.4 MG/DL
WBC # BLD AUTO: 6.2 K/UL (ref 4.1–11.1)

## 2022-02-16 NOTE — TELEPHONE ENCOUNTER
Your lab work came back great! Your diabetes is well controlled. Your cholesterol is still high but you are already on the high dose of the pravastatin. Sending letter.      Arturo Alvarado MD

## 2022-03-19 PROBLEM — N18.6 END STAGE RENAL DISEASE ON DIALYSIS (HCC): Status: ACTIVE | Noted: 2021-12-20

## 2022-03-19 PROBLEM — Z99.2 END STAGE RENAL DISEASE ON DIALYSIS (HCC): Status: ACTIVE | Noted: 2021-12-20

## 2022-03-19 PROBLEM — Z89.511 HX OF RIGHT BKA (HCC): Status: ACTIVE | Noted: 2019-07-16

## 2022-05-02 ENCOUNTER — TELEPHONE (OUTPATIENT)
Dept: FAMILY MEDICINE CLINIC | Age: 57
End: 2022-05-02

## 2022-05-02 NOTE — TELEPHONE ENCOUNTER
Pt has an appt on 5-12-22 at 10 am with Dr Pao Virgen. Pt will need a sing  available during this appt.

## 2022-05-12 ENCOUNTER — OFFICE VISIT (OUTPATIENT)
Dept: FAMILY MEDICINE CLINIC | Age: 57
End: 2022-05-12
Payer: MEDICARE

## 2022-05-12 VITALS
TEMPERATURE: 98.5 F | OXYGEN SATURATION: 99 % | BODY MASS INDEX: 30.13 KG/M2 | SYSTOLIC BLOOD PRESSURE: 135 MMHG | RESPIRATION RATE: 20 BRPM | HEIGHT: 71 IN | HEART RATE: 93 BPM | WEIGHT: 215.2 LBS | DIASTOLIC BLOOD PRESSURE: 86 MMHG

## 2022-05-12 DIAGNOSIS — Z99.2 CHRONIC IN-CENTER HEMODIALYSIS STATUS (HCC): ICD-10-CM

## 2022-05-12 DIAGNOSIS — Z79.899 CONTROLLED SUBSTANCE AGREEMENT SIGNED: ICD-10-CM

## 2022-05-12 DIAGNOSIS — M79.602 LEFT ARM PAIN: Primary | ICD-10-CM

## 2022-05-12 DIAGNOSIS — G89.4 CHRONIC PAIN SYNDROME: ICD-10-CM

## 2022-05-12 DIAGNOSIS — Z99.2 HEMODIALYSIS ACCESS, FISTULA MATURE (HCC): ICD-10-CM

## 2022-05-12 PROCEDURE — 3017F COLORECTAL CA SCREEN DOC REV: CPT | Performed by: FAMILY MEDICINE

## 2022-05-12 PROCEDURE — 99214 OFFICE O/P EST MOD 30 MIN: CPT | Performed by: FAMILY MEDICINE

## 2022-05-12 PROCEDURE — G8510 SCR DEP NEG, NO PLAN REQD: HCPCS | Performed by: FAMILY MEDICINE

## 2022-05-12 PROCEDURE — G9231 DOC ESRD DIA TRANS PREG: HCPCS | Performed by: FAMILY MEDICINE

## 2022-05-12 PROCEDURE — G8427 DOCREV CUR MEDS BY ELIG CLIN: HCPCS | Performed by: FAMILY MEDICINE

## 2022-05-12 PROCEDURE — G8417 CALC BMI ABV UP PARAM F/U: HCPCS | Performed by: FAMILY MEDICINE

## 2022-05-12 RX ORDER — GABAPENTIN 100 MG/1
100 CAPSULE ORAL
Qty: 30 CAPSULE | Refills: 1 | Status: SHIPPED | OUTPATIENT
Start: 2022-05-12 | End: 2022-07-05

## 2022-05-12 NOTE — PROGRESS NOTES
[facility]    History of Present Illness:   Isidoro Angel is a 64 y.o. male with history of CAD, HTN, PAD, GERD, DDM, ESRD, Deafness, HLD, Gout  CC: Chronic Left arm Pain  History provided by patient and Records    HPI:  Patient with 6 months of left arm pain following Placement of fistula. Noting cramping pains at night and denies any pain during the day in particular. Patient has been using Oxicodone intermittently. Has not tried Gabapentin yet. Goes to Dialysis on M/W/F. Diabetes appears well controlled. Health Maintenance  Health Maintenance Due   Topic Date Due    DTaP/Tdap/Td series (1 - Tdap) Never done    Colorectal Cancer Screening Combo  Never done    Shingrix Vaccine Age 50> (1 of 2) Never done    Medicare Yearly Exam  Never done    Foot Exam Q1  04/15/2020    Pneumococcal 0-64 years (3 - PPSV23 or PCV20) 07/01/2020    Eye Exam Retinal or Dilated  02/14/2021    COVID-19 Vaccine (4 - Booster for Pfizer series) 12/20/2021       Past Medical, Family, and Social History:     Current Outpatient Medications on File Prior to Visit   Medication Sig Dispense Refill    insulin glargine (LANTUS,BASAGLAR) 100 unit/mL (3 mL) inpn 50 Units by SubCUTAneous route daily. 15 mL 5    insulin aspart U-100 (NOVOLOG) 100 unit/mL (3 mL) inpn 5 Units by SubCUTAneous route Before breakfast, lunch, and dinner. 15 mL 3    Insulin Needles, Disposable, 31 gauge x 5/16\" ndle Use with insulin as directed 200 Pen Needle 11    albuterol (PROVENTIL HFA, VENTOLIN HFA, PROAIR HFA) 90 mcg/actuation inhaler Take 2 Puffs by inhalation every four (4) hours as needed for Wheezing.  carvediloL (Coreg) 6.25 mg tablet Take 6.25 mg by mouth two (2) times a day.  cholecalciferol (Vitamin D3) (1000 Units /25 mcg) tablet Take 2,000 Units by mouth daily.  hydrALAZINE (APRESOLINE) 50 mg tablet Take 25 mg by mouth three (3) times daily.       NIFEdipine ER (PROCARDIA XL) 60 mg ER tablet Take 60 mg by mouth daily.  pravastatin (PRAVACHOL) 80 mg tablet Take 80 mg by mouth nightly.  sodium bicarbonate 650 mg tablet Take 1,300 mg by mouth two (2) times daily (with meals).  tacrolimus (PROGRAF) 1 mg capsule Take 1 mg by mouth two (2) times a day.  oxyCODONE IR (ROXICODONE) 5 mg immediate release tablet Take 5 mg by mouth every eight (8) hours as needed for Pain.  mycophenolate (CELLCEPT) 500 mg tablet Take 500 mg by mouth two (2) times a day. Take 2 tablets by mouth twice a day      compr.stocking,knee,long,large (COMP STOCKING,KNEE,LONG,LARGE) misc Wear on the right leg for swelling. Goal 20-40 mmhg of pressure. 1 Package 1    predniSONE (DELTASONE) 5 mg tablet Take 2.5 mg by mouth daily.  Dexlansoprazole (DEXILANT) 60 mg CpDM Take 30 mg by mouth daily (with breakfast).  sodium zirconium cyclosilicate (Lokelma) 10 gram powder packet Take 10 g by mouth daily. (Patient not taking: Reported on 5/12/2022)       No current facility-administered medications on file prior to visit. Patient Active Problem List   Diagnosis Code    Coronary artery disease I25.10    Deafness H91.90    HTN (hypertension) I10    Hx of right BKA ([facility] Z89.511    End stage renal disease on dialysis ([facility] N18.6, Z99.2    Anemia due to chronic kidney disease N18.9, D63.1    CAD (coronary artery disease) I25.10    DM type [facility] E11.29    Hyperlipidemia E78.5    GERD (gastroesophageal reflux disease) K21.9    Gout M10.9    Anemia D64.9    Dizziness R42    PAD (peripheral artery disease) ([facility]) I73.9    Kidney replaced by transplant Z94.0    Controlled substance agreement signed Z79.899       Social History     Socioeconomic History    Marital status: SINGLE   Tobacco Use    Smoking status: Passive Smoke Exposure - Never Smoker    Smokeless tobacco: Never Used   Substance and Sexual Activity    Alcohol use:  Yes     Alcohol/week: 4.2 standard drinks Types: 5 Cans of beer per week    Drug use: No    Sexual activity: Not Currently     Partners: Female        Review of Systems   ROS    Objective:     Visit Vitals  /86 (BP 1 Location: Right upper arm, BP Patient Position: Sitting, BP Cuff Size: Large adult)   Pulse 93   Temp 98.5 °F (36.9 °C) (Oral)   Resp 20   Ht 5' 11\" (1.803 m)   Wt 215 lb 3.2 oz (97.6 kg)   SpO2 99%   BMI 30.01 kg/m²        Physical Exam  Vitals and nursing note reviewed. Constitutional:       Appearance: Normal appearance. HENT:      Head: Normocephalic and atraumatic. Cardiovascular:      Rate and Rhythm: Normal rate and regular rhythm. Pulses: Normal pulses. Heart sounds: Normal heart sounds. Pulmonary:      Effort: Pulmonary effort is normal.      Breath sounds: Normal breath sounds. Abdominal:      General: Abdomen is flat. Bowel sounds are normal.      Palpations: Abdomen is soft. Musculoskeletal:      Cervical back: Normal range of motion and neck supple. Comments: Mature fistula on the left arm. Neurological:      Mental Status: He is alert. Pertinent Labs/Studies:      Assessment and orders:       ICD-10-CM ICD-9-CM    1. Left arm pain  M79.602 729.5 gabapentin (NEURONTIN) 100 mg capsule   2. Hemodialysis access, fistula mature ([facility]  Z99.2 V45.11    3. Chronic in-center hemodialysis status ([facility])  Z99.2 V45.11    4. Controlled substance agreement signed  Z79.899 V58.69 11-DRUG SCREEN, URINE W RFLX CONFIRM   5. Chronic pain syndrome  G89.4 338.4 11-DRUG SCREEN, URINE W RFLX CONFIRM     Diagnoses and all orders for this visit:    1. Left arm pain: Trial of Gabapentin considering for Oxycodone if not effective. Appears neuropathic pain following fistula placement,  -     gabapentin (NEURONTIN) 100 mg capsule; Take 1 Capsule by mouth nightly. Max Daily Amount: 100 mg.    2. Hemodialysis access, fistula mature (HCC)/Chronic in-center hemodialysis status ([facility]    4.  Controlled substance agreement signed  -     11-DRUG SCREEN, URINE W RFLX CONFIRM; Future    5. Chronic pain syndrome  -     11-DRUG SCREEN, URINE W RFLX CONFIRM; Future      Follow-up and Dispositions    · Return in about 3 weeks (around 6/2/2022). I have discussed the diagnosis with the patient and the intended plan as seen in the above orders. Social history, medical history, and labs were reviewed. The patient has received an after-visit summary and questions were answered concerning future plans. I have discussed medication side effects and warnings with the patient as well.     MD VIVIEN Amaya & PANCHO NORTH [facility]  05/12/22

## 2022-05-12 NOTE — LETTER
Name:Rudolph Aguilar  :1965   MR #:[id number]   Office:[address]   Page 1 of 5        CONTROLLED SUBSTANCE AGREEMENT     I may be prescribed medications that are controlled substances as part  of my treatment plan for management of my medical condition(s). The goal of my treatment plan is to maintain and/or improve my health and wellbeing. Because controlled substances have an increased risk of abuse or harm, continual re-evaluation is needed determine if the goals of my treatment plan are being met for my safety and the safety of others. Frandy Patterson  am entering into this Controlled Substance Agreement with my provider, __________________________________ at [address] [place] [address] . I understand that successful treatment requires mutual trust and honesty between me and my provider. I understand that there are state and federal laws and regulations which apply to the medications that my provider may prescribe that must be followed. I understand there are risks and benefits ts of taking the medicines that my provider may prescribe. I understand and agree that following this Agreement is necessary in continuing my provider-patient relationship and success of my treatment plan. As a part of my treatment plan, I agree to the following:    COMMUNICATION:    1. I will communicate fully with my provider about my medical condition(s), including the effect on my daily life and how well my medications are helping. I will tell my provider all of the medications that I take for any reason, including medications I receive from another health care provider, and will notify my provider about all issues, problems or concerns, including any side effects, which may be related to my medications. I understand that this information allows my provider to adjust my treatment plan to help manage my medical condition.  I understand that this information will become part of my permanent medical record. 2. I will notify my provider if I have a history of alcohol/drug misuse/addiction or if I have had treatment for alcohol/drug addiction in the past, or if I have a new problem with or concern about alcohol/drug use/addiction, because this increases the likelihood of high risk behaviors and may lead to serious medical conditions. 3. Females Only: I will notify my provider if I am or become pregnant, or if I intend to become pregnant, or if I intend to breastfeed. I understand that communication of these issues with my provider is important, due to possible effects my medication could have on an unborn fetus or breastfeeding child. Initials_____      Name:.Jaxon Aguilar   OIW:[birth year]   MR #:[id number]   Office:[address]   Page 2 of 5       MISUSE OF MEDICATIONS / DRUGS:    1. I agree to take all controlled substances as prescribed, and will not misuse or abuse any controlled substances prescribed by my provider. For my safety, I will not increase the amount of medicine I take without first talking with and getting permission from my provider. 2. If I have a medical emergency, another health care provider may prescribe me medication. If I seek emergency treatment, I will notify my provider within seventy-two (72) hours. 3. I understand that my provider may discuss my use and/or possible misuse/abuse of controlled substances and alcohol, as appropriate, with any health care provider involved in my care, pharmacist or legal authority. ILLEGAL DRUGS:    1. I will not use illegal drugs of any kind, including but not limited to marijuana, heroin, cocaine, or any prescription drug which is not prescribed to me. DRUG DIVERSION / PRESCRIPTION FRAUD:    1. I will not share, sell, trade, give away, or otherwise misuse my prescriptions or medications.     2. I will not alter any prescriptions provided to me by my provider. SINGLE PROVIDER:    1. I agree that all controlled substances that I take will be prescribed only by my provider (or his/her covering provider) under this Agreement. This agreement does not prevent me from seeking emergency medical treatment or receiving pain management related to a surgery. PROTECTING MEDICATIONS:    1. I am responsible for keeping my prescriptions and medications in a safe and secure place including safeguarding them from loss or theft. I understand that lost, stolen or damaged/destroyed prescriptions or medications will not be replaced. Initials____          Name:.Jaxon Aguilar   AIY:[birth year]   MR #:[id number]   Office:[facility]    Page 3 of 5   PRESCRIPTION RENEWALS/REFILLS:    1. I will follow my controlled substance medication schedule as prescribed by my provider. 2. I understand and agree that I will make any requests for renewals or refills of my prescriptions only at the time of an office visit or during my providers regular office hours subject to the prescription refill requirements of the individual practice. 3. I understand that my provider may not call in prescriptions for controlled substances to my pharmacy. 4. I understand that my provider may adjust or discontinue these medications as deemed appropriate for my medical treatment plan. This Agreement does not guarantee the prescription of controlled medications. 5. I agree that if my medications are adjusted or discontinued, I will properly dispose of any remaining medications. I understand that I will be required to dispose of any remaining controlled medications prior to being provided with any prescriptions for other controlled medications. 6. I understand that the renewal of my prescription depends on my medical condition, my consistent participation, and my adherence with my treatment plan and this Agreement.     7. I understand that if I do not keep an appointment with my provider, I may not receive a renewal or refill for my controlled substance medication. PRESCRIPTION MONITORING / DRUG TESTIN. I understand that my provider may require me to provide urine, saliva or blood for testing at any time. I understand that this testing will be used to monitor for safety and adherence with my treatment plan and this Agreement. 2. I understand that my provider may ask me to provide an observed urine specimen, which means that a nurse or other health care provider may watch me provide urine, and I agree to cooperate if I am asked to provide an observed specimen. 3. I understand that if I do not provide urine, saliva or blood samples within two (2) hours of my providers request, or other timeframe decided by my provider, my treatment plan could be changed, or my prescriptions and medications may be changed or ended. 4. I understand that urine, saliva and blood test results will be a part of my permanent medical record. Initials_____        Name:Rudolph Aguilar   YPZ:5920   MR #:[id number]   Office:[facility] [address]   Page 4 of 5    5. I understand that my provider is required to obtain a copy of my State Prescription Monitoring Program () Report at any time in order to safely prescribe medications. 6. I will bring all of my prescribed controlled substance medications in their original bottles to all of my scheduled appointments. 7. I understand that my provider may ask me to come to the practice with all of my prescribed medications for a random pill count at any time. I agree to cooperate if I am asked to come in for a random pill count. I understand that if I do not arrive in the timeframe decided by my provider, my treatment plan could be changed, or my prescriptions and medications may be changed or ended.     COOPERATION WITH INVESTIGATIONS:    1. I authorize my provider and my pharmacy to cooperate fully with any local, state, or federal law enforcement agency in the investigation of any possible misuse, sale, or other diversion of my controlled substance prescriptions or medications. RISKS:    1. I understand that my level of consciousness may be affected from the use of controlled substances, and I understand that there are risks, benefits, effects and potential alternatives (including no treatment) to the medications that my provider has prescribed. 2. I understand that I may become drowsy, tired, dizzy, constipated, and sick to my stomach, or have changes in my mood or in my sleep while taking my medications. I have talked with my provider about these possible side effects, risks, benefits, and alternative treatments, and my provider has answered all of my questions. 3. I understand that I should not suddenly stop taking my medications without first speaking with my provider. I understand that if I suddenly stop taking my medications, I may experience nausea, vomiting, sweating,anxiety, sleeplessness, itching or other uncomfortable feelings. 4. I will not take my medications with alcohol of any kind, including beer, wine or liquor. I understand that drinking alcohol with my medications increases the chances of side effects, including breathing problems or even death. 5. I understand that if I have a history of alcoholism or other drug addiction I may be at increased risk of addiction to my medications. Signs of addiction might include craving, compulsive use, and continued use despite harm. Since addiction is a disease, I understand my provider may decide to change my medications and refer me to appropriate treatment services. I understand that this information would become part of my permanent medical record. Initials_____        Name:Rudolph Aguilar   [facility][birth year]   MR #:[id number]   Office:[address]   Page 5 of 5       6.  Females only: Children born to women who regularly take controlled substances are likely to have physical problems and suffer withdrawal symptoms at birth. If I am of child-bearing age, I understand that I should use safe and effective birth control while taking any controlled substances to avoid the impact of medications on an unborn fetus or  child. I agree to notify my provider immediately if I should become pregnant so that my treatment plan can be adjusted. 7. Males only: I understand that chronic use of controlled substances has been associated with low testosterone levels in males which may affect my mood, stamina, sexual desire, and general health. I understand that my provider may order the appropriate laboratory test to determine my testosterone level,and I agree to this testing. ADHERENCE:    1. I understand that if I do not adhere to this Agreement in any way, my provider may change my prescriptions, stop prescribing controlled substances or end our provider-patient relationship. 2. If my provider decides to stop prescribing medication, or decides to end our provider-patient relationship,my provider may require that I taper my medications slowly. If necessary, my provider may also provide a prescription for other medications to treat my withdrawal symptoms. UNDERSTANDING THIS AGREEMENT:    I understand that my provider may adjust or stop my prescriptions for controlled substances based on my medical condition and my treatment plan. I understand that this Agreement does not guarantee that I will be prescribed medications or controlled substances. I understand that controlled substances may be just one part  of my treatment plan. My initial on each page and my signature below shows that I have read each page of this Agreement, I have had an opportunity to ask questions, and all of my questions have been answered to my satisfaction by my provider.     By signing below, I agree to comply with this Agreement, and I understand that if I do not follow the Agreements listed above, my provider may stop    _________________________________________  Date/Time 5/12/2022 10:55 AM                 (Patient Signature)    ________________________________________    Date/Time 5/12/2022 10:55 AM   (Parent or Guardian Signature if <18 yrs)    _________________________________________ Date/Time 5/12/2022 10:55 AM   (Provider Signature)

## 2022-05-12 NOTE — PROGRESS NOTES
1. \"Have you been to the ER, urgent care clinic since your last visit? Hospitalized since your last visit? \" No    2. \"Have you seen or consulted any other health care providers outside of the [address] [place] [address] since your last visit? \" No     3. For patients aged 39-70: Has the patient had a colonoscopy / FIT/ Cologuard? No      If the patient is female:    4. For patients aged 41-77: Has the patient had a mammogram within the past 2 years? NA - based on age or sex      11. For patients aged 21-65: Has the patient had a pap smear?  NA - based on age or sex    Health Maintenance Due   Topic Date Due    DTaP/Tdap/Td series (1 - Tdap) Never done    Colorectal Cancer Screening Combo  Never done    Shingrix Vaccine Age 50> (1 of 2) Never done    Medicare Yearly Exam  Never done    Foot Exam Q1  04/15/2020    Pneumococcal 0-64 years (3 - PPSV23 or PCV20) 07/01/2020    Eye Exam Retinal or Dilated  02/14/2021    COVID-19 Vaccine (4 - Booster for Pfizer series) 12/20/2021

## 2022-05-26 ENCOUNTER — TELEPHONE (OUTPATIENT)
Dept: FAMILY MEDICINE CLINIC | Age: 57
End: 2022-05-26

## 2022-05-26 NOTE — TELEPHONE ENCOUNTER
Pt has surgery on 6-7, [facility] is asking for us to send them the Pt's last office notes. Please faxed to [phone].

## 2022-07-05 ENCOUNTER — OFFICE VISIT (OUTPATIENT)
Dept: FAMILY MEDICINE CLINIC | Age: 57
End: 2022-07-05
Payer: MEDICARE

## 2022-07-05 VITALS
SYSTOLIC BLOOD PRESSURE: 166 MMHG | TEMPERATURE: 97.9 F | RESPIRATION RATE: 16 BRPM | WEIGHT: 218 LBS | OXYGEN SATURATION: 100 % | HEIGHT: 71 IN | DIASTOLIC BLOOD PRESSURE: 90 MMHG | BODY MASS INDEX: 30.52 KG/M2 | HEART RATE: 86 BPM

## 2022-07-05 DIAGNOSIS — Z00.00 MEDICARE ANNUAL WELLNESS VISIT, SUBSEQUENT: Primary | ICD-10-CM

## 2022-07-05 DIAGNOSIS — G89.4 CHRONIC PAIN SYNDROME: ICD-10-CM

## 2022-07-05 DIAGNOSIS — E11.22 TYPE 2 DIABETES MELLITUS WITH CHRONIC KIDNEY DISEASE, WITH LONG-TERM CURRENT USE OF INSULIN, UNSPECIFIED CKD STAGE (HCC): ICD-10-CM

## 2022-07-05 DIAGNOSIS — M79.602 LEFT ARM PAIN: ICD-10-CM

## 2022-07-05 DIAGNOSIS — N18.6 END STAGE RENAL DISEASE ON DIALYSIS (HCC): ICD-10-CM

## 2022-07-05 DIAGNOSIS — Z79.4 TYPE 2 DIABETES MELLITUS WITH CHRONIC KIDNEY DISEASE, WITH LONG-TERM CURRENT USE OF INSULIN, UNSPECIFIED CKD STAGE (HCC): ICD-10-CM

## 2022-07-05 DIAGNOSIS — Z99.2 END STAGE RENAL DISEASE ON DIALYSIS (HCC): ICD-10-CM

## 2022-07-05 LAB
AMPHETAMINE QL URINE POC: NEGATIVE
BARBITURATES UR POC: NEGATIVE
BENZODIAZEPINES UR POC: NEGATIVE
COCAINE QL URINE POC: NEGATIVE
LOT EXP DATE POC: NORMAL
LOT NUMBER POC: NORMAL
MARIJUANA (THC) QL URINE POC: NEGATIVE
MDMA/ECSTASY UR POC: NEGATIVE
METHADONE QL URINE POC: NORMAL
METHAMPHETAMINE QL URINE POC: NEGATIVE
OPIATES 300 QL URINE POC: NORMAL
OXYCODONE UR POC: NEGATIVE
PHENCYCLIDINE QL URINE POC: NEGATIVE
TRICYCLIC ANTIDEPRESSANTS (TCA) QL URINE POC: NEGATIVE

## 2022-07-05 PROCEDURE — 2022F DILAT RTA XM EVC RTNOPTHY: CPT | Performed by: FAMILY MEDICINE

## 2022-07-05 PROCEDURE — G0439 PPPS, SUBSEQ VISIT: HCPCS | Performed by: FAMILY MEDICINE

## 2022-07-05 PROCEDURE — G8510 SCR DEP NEG, NO PLAN REQD: HCPCS | Performed by: FAMILY MEDICINE

## 2022-07-05 PROCEDURE — 3044F HG A1C LEVEL LT 7.0%: CPT | Performed by: FAMILY MEDICINE

## 2022-07-05 PROCEDURE — 80305 DRUG TEST PRSMV DIR OPT OBS: CPT | Performed by: FAMILY MEDICINE

## 2022-07-05 PROCEDURE — 99214 OFFICE O/P EST MOD 30 MIN: CPT | Performed by: FAMILY MEDICINE

## 2022-07-05 PROCEDURE — G8417 CALC BMI ABV UP PARAM F/U: HCPCS | Performed by: FAMILY MEDICINE

## 2022-07-05 PROCEDURE — 3017F COLORECTAL CA SCREEN DOC REV: CPT | Performed by: FAMILY MEDICINE

## 2022-07-05 PROCEDURE — G8427 DOCREV CUR MEDS BY ELIG CLIN: HCPCS | Performed by: FAMILY MEDICINE

## 2022-07-05 PROCEDURE — G9231 DOC ESRD DIA TRANS PREG: HCPCS | Performed by: FAMILY MEDICINE

## 2022-07-05 RX ORDER — OXYCODONE HYDROCHLORIDE 5 MG/1
5 TABLET ORAL
Qty: 14 TABLET | Refills: 0 | Status: SHIPPED | OUTPATIENT
Start: 2022-07-05 | End: 2022-07-12

## 2022-07-05 RX ORDER — NALOXONE HYDROCHLORIDE 4 MG/.1ML
SPRAY NASAL
Qty: 2 EACH | Refills: 1 | Status: SHIPPED | OUTPATIENT
Start: 2022-07-05

## 2022-07-05 RX ORDER — GABAPENTIN 300 MG/1
300 CAPSULE ORAL DAILY
Qty: 30 CAPSULE | Refills: 0 | Status: SHIPPED | OUTPATIENT
Start: 2022-07-05

## 2022-07-05 NOTE — PROGRESS NOTES
This is the Subsequent Medicare Annual Wellness Exam, performed 12 months or more after the Initial AWV or the last Subsequent AWV    I have reviewed the patient's medical history in detail and updated the computerized patient record. Assessment/Plan   Education and counseling provided:  Are appropriate based on today's review and evaluation    1. End stage renal disease on dialysis ([facility]  2. Type 2 diabetes mellitus with chronic kidney disease, with long-term current use of insulin, unspecified CKD stage ([facility]  3. Left arm pain  -     gabapentin (NEURONTIN) 300 mg capsule; Take 1 Capsule by mouth daily. Max Daily Amount: 300 mg. Start by taking after Dialysis for the first week., Normal, Disp-30 Capsule, R-0  4. Chronic pain syndrome  -     gabapentin (NEURONTIN) 300 mg capsule; Take 1 Capsule by mouth daily. Max Daily Amount: 300 mg. Start by taking after Dialysis for the first week., Normal, Disp-30 Capsule, R-0  -     oxyCODONE IR (ROXICODONE) 5 mg immediate release tablet; Take 1 Tablet by mouth two (2) times daily as needed for Pain for up to 7 days. Max Daily Amount: 10 mg., Normal, Disp-14 Tablet, R-0  -     11-DRUG SCREEN, URINE W RFLX CONFIRM; Future  -     naloxone (NARCAN) 4 mg/actuation nasal spray; Use 1 spray intranasally, then discard. Repeat with new spray every 2 min as needed for opioid overdose symptoms, alternating nostrils. , Normal, Disp-2 Each, R-1  5.  Medicare annual wellness visit, subsequent       Depression Risk Factor Screening     3 most recent PHQ Screens 7/5/2022   Little interest or pleasure in doing things Not at all   Feeling down, depressed, irritable, or hopeless Not at all   Total Score PHQ 2 0       Alcohol & Drug Abuse Risk Screen    Do you average more than 2 drinks per night or 14 drinks a week: No    On any one occasion in the past three months have you have had more than 4 drinks containing alcohol:  No          Functional Ability and Level of Safety    Hearing: Patient Deaf      Activities of Daily Living: The home contains: grab bars  Patient does total self care      Ambulation: with no difficulty     Fall Risk:  Fall Risk Assessment, last 12 mths 2/14/2019   Able to walk? Yes   Fall in past 12 months?  No      Abuse Screen:  Patient is not abused       Cognitive Screening    Has your family/caregiver stated any concerns about your memory: no     Cognitive Screening: Normal - MMSE (Mini Mental Status Exam)    Health Maintenance Due     Health Maintenance Due   Topic Date Due    DTaP/Tdap/Td series (1 - Tdap) Never done    Colorectal Cancer Screening Combo  Never done    Shingrix Vaccine Age 50> (1 of 2) Never done    Foot Exam Q1  04/15/2020    Pneumococcal 0-64 years (3 - PPSV23 or PCV20) 07/01/2020    Eye Exam Retinal or Dilated  02/14/2021    COVID-19 Vaccine (4 - Booster for Marquez Peter series) 12/20/2021       Patient Care Team   Patient Care Team:  Jo-Ann Hubbard MD as PCP - General (Family Medicine)  Jo-Ann Hubbard MD as PCP - [address] [place] [address] [facility]    History     Patient Active Problem List   Diagnosis Code    Coronary artery disease I25.10    Deafness H91.90    HTN (hypertension) I10    Hx of right BKA ([address] Z89.511    End stage renal disease on dialysis ([address] N18.6, Z99.2    Anemia due to chronic kidney disease N18.9, D63.1    CAD (coronary artery disease) I25.10    DM type 2 causing renal disease ([address] E11.29    Hyperlipidemia E78.5    GERD (gastroesophageal reflux disease) K21.9    Gout M10.9    Anemia D64.9    Dizziness R42    PAD (peripheral artery disease) ([address] I73.9    Kidney replaced by transplant Z94.0    Controlled substance agreement signed Z79.899     Past Medical History:   Diagnosis Date    Anemia due to chronic kidney disease     CAD (coronary artery disease)     Status post non-Q wave MI May 2010 with cardiac catheterization and a subsequent CABG with LIMA to LAD and sequential saphenous vein graft to posterolateral branch and PDA by Dr. Abdon Holt.  Deaf     DM type 2 causing renal disease ([facility]     [facility]     GERD (gastroesophageal reflux disease)     Gout     HTN (hypertension)     Hyperlipidemia     Kidney replaced by transplant     PAD (peripheral artery disease) ([facility]       Past Surgical History:   Procedure Laterality Date    HX BELOW KNEE AMPUTATION Right 06/2019    HX CORONARY ARTERY BYPASS GRAFT      LIMA to LAD and sequential saphenous vein graft to posterolateral branch and PDA by Dr. Abdon Holt.  HX TRANSPLANT      kidnes 2013    LA CABG, VEIN, THREE      May 2011    LA CARDIAC SURG PROCEDURE UNLIST       Current Outpatient Medications   Medication Sig Dispense Refill    gabapentin (NEURONTIN) 300 mg capsule Take 1 Capsule by mouth daily. Max Daily Amount: 300 mg. Start by taking after Dialysis for the first week. 30 Capsule 0    oxyCODONE IR (ROXICODONE) 5 mg immediate release tablet Take 1 Tablet by mouth two (2) times daily as needed for Pain for up to 7 days. Max Daily Amount: 10 mg. 14 Tablet 0    naloxone (NARCAN) 4 mg/actuation nasal spray Use 1 spray intranasally, then discard. Repeat with new spray every 2 min as needed for opioid overdose symptoms, alternating nostrils. 2 Each 1    insulin glargine (LANTUS,BASAGLAR) 100 unit/mL (3 mL) inpn 50 Units by SubCUTAneous route daily. 15 mL 5    insulin aspart U-100 (NOVOLOG) 100 unit/mL (3 mL) inpn 5 Units by SubCUTAneous route Before breakfast, lunch, and dinner. 15 mL 3    Insulin Needles, Disposable, 31 gauge x 5/16\" ndle Use with insulin as directed 200 Pen Needle 11    albuterol (PROVENTIL HFA, VENTOLIN HFA, PROAIR HFA) 90 mcg/actuation inhaler Take 2 Puffs by inhalation every four (4) hours as needed for Wheezing.  cholecalciferol (Vitamin D3) (1000 Units /25 mcg) tablet Take 2,000 Units by mouth daily.  hydrALAZINE (APRESOLINE) 50 mg tablet Take 25 mg by mouth three (3) times daily.       NIFEdipine ER (PROCARDIA XL) 60 mg ER tablet Take 60 mg by mouth daily.  sodium bicarbonate 650 mg tablet Take 1,300 mg by mouth two (2) times daily (with meals).  mycophenolate (CELLCEPT) 500 mg tablet Take 500 mg by mouth two (2) times a day. Take 2 tablets by mouth twice a day      Dexlansoprazole (DEXILANT) 60 mg CpDM Take 30 mg by mouth daily (with breakfast).  carvediloL (Coreg) 6.25 mg tablet Take 6.25 mg by mouth two (2) times a day. (Patient not taking: Reported on 7/5/2022)      pravastatin (PRAVACHOL) 80 mg tablet Take 80 mg by mouth nightly. (Patient not taking: Reported on 7/5/2022)      sodium zirconium cyclosilicate (Lokelma) 10 gram powder packet Take 10 g by mouth daily. (Patient not taking: Reported on 5/12/2022)      tacrolimus (PROGRAF) 1 mg capsule Take 1 mg by mouth two (2) times a day. (Patient not taking: Reported on 7/5/2022)      compr.stocking,knee,long,large (COMP STOCKING,KNEE,LONG,LARGE) misc Wear on the right leg for swelling. Goal 20-40 mmhg of pressure. 1 Package 1    predniSONE (DELTASONE) 5 mg tablet Take 2.5 mg by mouth daily. (Patient not taking: Reported on 7/5/2022)       No Known Allergies    Family History   Problem Relation Age of Onset    Cancer Mother     Diabetes Mother     Cancer Father     Heart Disease Other      Social History     Tobacco Use    Smoking status: Passive Smoke Exposure - Never Smoker    Smokeless tobacco: Never Used   Substance Use Topics    Alcohol use:  Yes     Alcohol/week: 4.2 standard drinks     Types: 5 Cans of beer per week         Daysi Molina MD

## 2022-07-05 NOTE — PROGRESS NOTES
1. Have you been to the ER, urgent care clinic since your last visit? Hospitalized since your last visit? Yes When: Jill Rosen for port placement     2. Have you seen or consulted any other health care providers outside of the [address] [place] [address] since your last visit? Include any pap smears or colon screening. No    3. For patients aged 39-70: Has the patient had a colonoscopy / FIT/ Cologuard? NO     If the patient is female:    4. For patients aged 41-77: Has the patient had a mammogram within the past 2 years? NA - based on age or sex      11. For patients aged 21-65: Has the patient had a pap smear? NA - based on age or sex     Reviewed record in preparation for visit and have necessary documentation  Pt did not bring medication to office visit for review  Patient is accompanied by nursing attendant and partner I have received verbal consent from Petra Moffett to discuss any/all medical information while they are present in the room.     Goals that were addressed and/or need to be completed during or after this appointment include     Health Maintenance Due   Topic Date Due    DTaP/Tdap/Td series (1 - Tdap) Never done    Colorectal Cancer Screening Combo  Never done    Shingrix Vaccine Age 50> (1 of 2) Never done    Medicare Yearly Exam  Never done    Foot Exam Q1  04/15/2020    Pneumococcal 0-64 years (3 - PPSV23 or PCV20) 07/01/2020    Eye Exam Retinal or Dilated  02/14/2021    COVID-19 Vaccine (4 - Booster for Pfizer series) 12/20/2021

## 2022-07-05 NOTE — PROGRESS NOTES
[facility]    History of Present Illness:   Kari Santos is a 62 y.o. male with history of CAD, HTN, PAD, GERD, DDM, ESRD, Deafness, HLD, Gout  CC: Left arm pains   History provided by patient and Records    HPI:  Persistent issues with left arm pain and particularly bad in the left hand and in the thenar side of the left hand and in the middle and pointer finger. Pain is hard to describe. Pain is worst at night and in the morning patient has to squeeze hand multiple times to start feeling betting. Patient is no longer on Oxycodone at this time. Stopped Gabapentin because it did not seem to be working. Diabetes: Overall well controlled. Health Maintenance  Health Maintenance Due   Topic Date Due    DTaP/Tdap/Td series (1 - Tdap) Never done    Colorectal Cancer Screening Combo  Never done    Shingrix Vaccine Age 50> (1 of 2) Never done    Medicare Yearly Exam  Never done    Foot Exam Q1  04/15/2020    Pneumococcal 0-64 years (3 - PPSV23 or PCV20) 07/01/2020    Eye Exam Retinal or Dilated  02/14/2021    COVID-19 Vaccine (4 - Booster for Pfizer series) 12/20/2021       Past Medical, Family, and Social History:     Current Outpatient Medications on File Prior to Visit   Medication Sig Dispense Refill    insulin glargine (LANTUS,BASAGLAR) 100 unit/mL (3 mL) inpn 50 Units by SubCUTAneous route daily. 15 mL 5    insulin aspart U-100 (NOVOLOG) 100 unit/mL (3 mL) inpn 5 Units by SubCUTAneous route Before breakfast, lunch, and dinner. 15 mL 3    Insulin Needles, Disposable, 31 gauge x 5/16\" ndle Use with insulin as directed 200 Pen Needle 11    albuterol (PROVENTIL HFA, VENTOLIN HFA, PROAIR HFA) 90 mcg/actuation inhaler Take 2 Puffs by inhalation every four (4) hours as needed for Wheezing.  cholecalciferol (Vitamin D3) (1000 Units /25 mcg) tablet Take 2,000 Units by mouth daily.  hydrALAZINE (APRESOLINE) 50 mg tablet Take 25 mg by mouth three (3) times daily.       NIFEdipine ER (PROCARDIA XL) 60 mg ER tablet Take 60 mg by mouth daily.  sodium bicarbonate 650 mg tablet Take 1,300 mg by mouth two (2) times daily (with meals).  mycophenolate (CELLCEPT) 500 mg tablet Take 500 mg by mouth two (2) times a day. Take 2 tablets by mouth twice a day      Dexlansoprazole (DEXILANT) 60 mg CpDM Take 30 mg by mouth daily (with breakfast).  gabapentin (NEURONTIN) 100 mg capsule Take 1 Capsule by mouth nightly. Max Daily Amount: 100 mg. (Patient not taking: Reported on 7/5/2022) 30 Capsule 1    carvediloL (Coreg) 6.25 mg tablet Take 6.25 mg by mouth two (2) times a day. (Patient not taking: Reported on 7/5/2022)      pravastatin (PRAVACHOL) 80 mg tablet Take 80 mg by mouth nightly. (Patient not taking: Reported on 7/5/2022)      sodium zirconium cyclosilicate (Lokelma) 10 gram powder packet Take 10 g by mouth daily. (Patient not taking: Reported on 5/12/2022)      tacrolimus (PROGRAF) 1 mg capsule Take 1 mg by mouth two (2) times a day. (Patient not taking: Reported on 7/5/2022)      oxyCODONE IR (ROXICODONE) 5 mg immediate release tablet Take 5 mg by mouth every eight (8) hours as needed for Pain. (Patient not taking: Reported on 7/5/2022)      compr.stocking,knee,long,large (COMP STOCKING,KNEE,LONG,LARGE) misc Wear on the right leg for swelling. Goal 20-40 mmhg of pressure. 1 Package 1    predniSONE (DELTASONE) 5 mg tablet Take 2.5 mg by mouth daily. (Patient not taking: Reported on 7/5/2022)       No current facility-administered medications on file prior to visit.        Patient Active Problem List   Diagnosis Code    Coronary artery disease I25.10    Deafness H91.90    HTN (hypertension) I10    Hx of right BKA ([facility] Z89.511    End stage renal disease on dialysis ([facility] N18.6, Z99.2    Anemia due to chronic kidney disease N18.9, D63.1    CAD (coronary artery disease) I25.10    [facility] E11.29    Hyperlipidemia E78.5    GERD (gastroesophageal reflux disease) K21.9    Gout M10.9    Anemia D64.9    Dizziness R42    PAD (peripheral artery disease) ([facility]) I73.9    Kidney replaced by transplant Z94.0    Controlled substance agreement signed Z79.899       Social History     Socioeconomic History    Marital status: SINGLE   Tobacco Use    Smoking status: Passive Smoke Exposure - Never Smoker    Smokeless tobacco: Never Used   Substance and Sexual Activity    Alcohol use: Yes     Alcohol/week: 4.2 standard drinks     Types: 5 Cans of beer per week    Drug use: No    Sexual activity: Not Currently     Partners: Female        Review of Systems   Review of Systems   Constitutional: Negative for chills and fever. Cardiovascular: Negative for chest pain and palpitations. Gastrointestinal: Negative for heartburn, nausea and vomiting. Objective:     Visit Vitals  BP (!) 166/88   Pulse 86   Temp 97.9 °F (36.6 °C)   Resp 16   Ht 5' 11\" (1.803 m)   Wt 218 lb (98.9 kg)   SpO2 100%   BMI 30.40 kg/m²        Physical Exam  Vitals and nursing note reviewed. Constitutional:       Appearance: Normal appearance. HENT:      Head: Normocephalic and atraumatic. Cardiovascular:      Rate and Rhythm: Normal rate and regular rhythm. Pulses: Normal pulses. Heart sounds: Normal heart sounds. Pulmonary:      Effort: Pulmonary effort is normal.      Breath sounds: Normal breath sounds. Abdominal:      General: Abdomen is flat. Bowel sounds are normal.      Palpations: Abdomen is soft. Musculoskeletal:      Cervical back: Normal range of motion and neck supple. Comments: Pain nad altered sensation in the median nerve distrubution of left hand   Neurological:      Mental Status: He is alert. Pertinent Labs/Studies:      Assessment and orders:       ICD-10-CM ICD-9-CM    1. End stage renal disease on dialysis ([facility])  N18.6 585.6     Z99.2 V45.11    2.  Type 2 diabetes mellitus with chronic kidney disease, with long-term current use of insulin, unspecified CKD stage ([facility]  E11.22 250.40     Z79.4 585. 9      V58.67    3. Left arm pain  M79.602 729.5 gabapentin (NEURONTIN) 300 mg capsule   4. Chronic pain syndrome  G89.4 338.4 gabapentin (NEURONTIN) 300 mg capsule      oxyCODONE IR (ROXICODONE) 5 mg immediate release tablet      11-DRUG SCREEN, URINE W RFLX CONFIRM      naloxone (NARCAN) 4 mg/actuation nasal spray     Diagnoses and all orders for this visit:    1. End stage renal disease on dialysis ([facility]    2. Type 2 diabetes mellitus with chronic kidney disease, with long-term current use of insulin, unspecified CKD stage ([facility]    3. Left arm pain  -     gabapentin (NEURONTIN) 300 mg capsule; Take 1 Capsule by mouth daily. Max Daily Amount: 300 mg. Start by taking after Dialysis for the first week. 4. Chronic pain syndrome: Patient already with [address] on file, Given HD limiting amount of Gabapentin trial also low dose Oxicodone for pain control. Patient to talk with surgeon for HD fistula as based on pain present concern impingement is in the elbow region as opposed to the wrist.    -     gabapentin (NEURONTIN) 300 mg capsule; Take 1 Capsule by mouth daily. Max Daily Amount: 300 mg. Start by taking after Dialysis for the first week. -     oxyCODONE IR (ROXICODONE) 5 mg immediate release tablet; Take 1 Tablet by mouth two (2) times daily as needed for Pain for up to 7 days. Max Daily Amount: 10 mg.  -     11-DRUG SCREEN, URINE W RFLX CONFIRM; Future  -     naloxone (NARCAN) 4 mg/actuation nasal spray; Use 1 spray intranasally, then discard. Repeat with new spray every 2 min as needed for opioid overdose symptoms, alternating nostrils. Follow-up and Dispositions    · Return in about 5 weeks (around 8/12/2022). I have discussed the diagnosis with the patient and the intended plan as seen in the above orders. Social history, medical history, and labs were reviewed.   The patient has received an after-visit summary and questions were answered concerning future plans. I have discussed medication side effects and warnings with the patient as well.     MD VIVIEN Alvares [facility]  07/05/22

## 2022-07-05 NOTE — PATIENT INSTRUCTIONS
- Spoke to MrNorma Jackson about Pain. Concern is related to a Median nerve impingment in the elbow. - Questioning if MRI would be helpful at this time? Is there any available surgical intervention for this?  - Plan to treat with Pain management for now. Medicare Wellness Visit, Male    The best way to live healthy is to have a lifestyle where you eat a well-balanced diet, exercise regularly, limit alcohol use, and quit all forms of tobacco/nicotine, if applicable. Regular preventive services are another way to keep healthy. Preventive services (vaccines, screening tests, monitoring & exams) can help personalize your care plan, which helps you manage your own care. Screening tests can find health problems at the earliest stages, when they are easiest to treat. [facility] follows the current, evidence-based guidelines published by the [facility] ([facility]) when recommending preventive services for our patients. Because we follow these guidelines, sometimes recommendations change over time as research supports it. (For example, a prostate screening blood test is no longer routinely recommended for men with no symptoms). Of course, you and your doctor may decide to screen more often for some diseases, based on your risk and co-morbidities (chronic disease you are already diagnosed with). Preventive services for you include:  - Medicare offers their members a free annual wellness visit, which is time for you and your primary care provider to discuss and plan for your preventive service needs. Take advantage of this benefit every year!  -All adults over age 72 should receive the recommended pneumonia vaccines.  Current USPSTF guidelines recommend a series of two vaccines for the best pneumonia protection.   -All adults should have a flu vaccine yearly and tetanus vaccine every 10 years.  -All adults age 48 and older should receive the shingles vaccines (series of two vaccines). -All adults age 38-68 who are overweight should have a diabetes screening test once every three years.   -Other screening tests & preventive services for persons with diabetes include: an eye exam to screen for diabetic retinopathy, a kidney function test, a foot exam, and stricter control over your cholesterol.   -Cardiovascular screening for adults with routine risk involves an electrocardiogram (ECG) at intervals determined by the provider.   -Colorectal cancer screening should be done for adults age 54-65 with no increased risk factors for colorectal cancer. There are a number of acceptable methods of screening for this type of cancer. Each test has its own benefits and drawbacks. Discuss with your provider what is most appropriate for you during your annual wellness visit. The different tests include: colonoscopy (considered the best screening method), a fecal occult blood test, a fecal DNA test, and sigmoidoscopy.  -All adults born between [facility] should be screened once for Hepatitis C.  -An Abdominal Aortic Aneurysm (AAA) Screening is recommended for men age 73-68 who has ever smoked in their lifetime.      Here is a list of your current Health Maintenance items (your personalized list of preventive services) with a due date:  Health Maintenance Due   Topic Date Due    DTaP/Tdap/Td  (1 - Tdap) Never done    Colorectal Screening  Never done    Shingles Vaccine (1 of 2) Never done    Diabetic Foot Care  04/15/2020    Pneumococcal Vaccine (3 - PPSV23 or PCV20) 07/01/2020    Eye Exam  02/14/2021    COVID-19 Vaccine (4 - Booster for Marquez Peter series) 12/20/2021

## 2022-07-13 ENCOUNTER — TELEPHONE (OUTPATIENT)
Dept: FAMILY MEDICINE CLINIC | Age: 57
End: 2022-07-13

## 2022-07-13 DIAGNOSIS — G56.02 CARPAL TUNNEL SYNDROME OF LEFT WRIST: Primary | ICD-10-CM

## 2022-07-13 NOTE — TELEPHONE ENCOUNTER
Referral placed for ortho evaluation. Patient with carpal tunnel-like syndrome though developed following placement of fistula for hemodialysis.     MD VIVIEN Alvares [facility]  07/13/22

## 2022-07-13 NOTE — TELEPHONE ENCOUNTER
----- Message from Brianna Mary Anne sent at 7/13/2022 12:32 PM EDT -----  Subject: Referral Request    Reason for referral request? hand specialist for carpal tunnel   Provider patient wants to be referred to(if known):     Provider Phone Number(if known):     Additional Information for Provider?   ---------------------------------------------------------------------------  --------------  [id number] [facility]    [phone]; OK to leave message on voicemail  ---------------------------------------------------------------------------  --------------

## 2022-08-01 ENCOUNTER — TELEPHONE (OUTPATIENT)
Dept: FAMILY MEDICINE CLINIC | Age: 57
End: 2022-08-01

## 2022-08-08 ENCOUNTER — OFFICE VISIT (OUTPATIENT)
Dept: FAMILY MEDICINE CLINIC | Age: 57
End: 2022-08-08
Payer: MEDICARE

## 2022-08-08 ENCOUNTER — TELEPHONE (OUTPATIENT)
Dept: FAMILY MEDICINE CLINIC | Age: 57
End: 2022-08-08

## 2022-08-08 VITALS
SYSTOLIC BLOOD PRESSURE: 157 MMHG | BODY MASS INDEX: 30.8 KG/M2 | HEART RATE: 85 BPM | OXYGEN SATURATION: 97 % | DIASTOLIC BLOOD PRESSURE: 83 MMHG | HEIGHT: 71 IN | WEIGHT: 220 LBS | RESPIRATION RATE: 18 BRPM | TEMPERATURE: 98.2 F

## 2022-08-08 DIAGNOSIS — G56.02 CARPAL TUNNEL SYNDROME OF LEFT WRIST: Primary | ICD-10-CM

## 2022-08-08 DIAGNOSIS — Z99.2 PERITONEAL DIALYSIS STATUS (HCC): ICD-10-CM

## 2022-08-08 DIAGNOSIS — H91.93 BILATERAL DEAFNESS: ICD-10-CM

## 2022-08-08 PROCEDURE — 99214 OFFICE O/P EST MOD 30 MIN: CPT | Performed by: FAMILY MEDICINE

## 2022-08-08 PROCEDURE — 3017F COLORECTAL CA SCREEN DOC REV: CPT | Performed by: FAMILY MEDICINE

## 2022-08-08 PROCEDURE — G8510 SCR DEP NEG, NO PLAN REQD: HCPCS | Performed by: FAMILY MEDICINE

## 2022-08-08 PROCEDURE — G8417 CALC BMI ABV UP PARAM F/U: HCPCS | Performed by: FAMILY MEDICINE

## 2022-08-08 PROCEDURE — G8427 DOCREV CUR MEDS BY ELIG CLIN: HCPCS | Performed by: FAMILY MEDICINE

## 2022-08-08 PROCEDURE — G9231 DOC ESRD DIA TRANS PREG: HCPCS | Performed by: FAMILY MEDICINE

## 2022-08-08 RX ORDER — OXYCODONE HYDROCHLORIDE 5 MG/1
5 TABLET ORAL 2 TIMES DAILY
Qty: 60 TABLET | Refills: 0 | Status: SHIPPED | OUTPATIENT
Start: 2022-08-08 | End: 2022-09-07

## 2022-08-08 NOTE — PROGRESS NOTES
[facility]    History of Present Illness:   Neelam Goyal is a 62 y.o. male with history of  CAD, HTN, PAD, GERD, DDM, ESRD, Deafness, HLD, Gout  CC: Left hand pain  History provided by patient and Records   used: Brandt Robbins [id number]    HPI:  Patient has persistent left hand pain ongoing for 7 months now with recently increased pain and now reduced ability to clench hand/close the thumb and 2 fingers due to pain and numbness as well. Per Daughter in law noting also the area of the the hand appears to be \"smaller\" as he has not been able to use it regularly. Patient reports severe pain in the hand and Gabapentin was ineffective for pain control. Patient did not receive follow up call bu patient is deaf. Renal Failure: Patient switched to peritoneal dialysis nightly 1 month ago. Doing ell. Dialyses at night starting at about 10 PM.    Health Maintenance  Health Maintenance Due   Topic Date Due    Shingrix Vaccine Age 49> (1 of 2) Never done    DTaP/Tdap/Td series (1 - Tdap) Never done    Colorectal Cancer Screening Combo  Never done    Foot Exam Q1  04/15/2020    Pneumococcal 0-64 years (3 - PPSV23 or PCV20) 07/01/2020    Eye Exam Retinal or Dilated  02/14/2021    COVID-19 Vaccine (4 - Booster for Pfizer series) 12/13/2021       Past Medical, Family, and Social History:     Current Outpatient Medications on File Prior to Visit   Medication Sig Dispense Refill    insulin glargine (LANTUS,BASAGLAR) 100 unit/mL (3 mL) inpn 50 Units by SubCUTAneous route daily. 15 mL 5    insulin aspart U-100 (NOVOLOG) 100 unit/mL (3 mL) inpn 5 Units by SubCUTAneous route Before breakfast, lunch, and dinner. 15 mL 3    albuterol (PROVENTIL HFA, VENTOLIN HFA, PROAIR HFA) 90 mcg/actuation inhaler Take 2 Puffs by inhalation every four (4) hours as needed for Wheezing. hydrALAZINE (APRESOLINE) 50 mg tablet Take 25 mg by mouth three (3) times daily.       sodium bicarbonate 650 mg tablet Take 1,300 mg by mouth two (2) times daily (with meals). dexlansoprazole (DEXILANT) 60 mg CpDB capsule (delayed release) Take 30 mg by mouth daily (with breakfast). gabapentin (NEURONTIN) 300 mg capsule Take 1 Capsule by mouth daily. Max Daily Amount: 300 mg. Start by taking after Dialysis for the first week. (Patient not taking: Reported on 8/8/2022) 30 Capsule 0    naloxone (NARCAN) 4 mg/actuation nasal spray Use 1 spray intranasally, then discard. Repeat with new spray every 2 min as needed for opioid overdose symptoms, alternating nostrils. 2 Each 1    Insulin Needles, Disposable, 31 gauge x 5/16\" ndle Use with insulin as directed 200 Pen Needle 11    carvediloL (COREG) 6.25 mg tablet Take 6.25 mg by mouth two (2) times a day. (Patient not taking: No sig reported)      cholecalciferol (VITAMIN D3) (1000 Units /25 mcg) tablet Take 2,000 Units by mouth daily. (Patient not taking: Reported on 8/8/2022)      NIFEdipine ER (PROCARDIA XL) 60 mg ER tablet Take 60 mg by mouth daily. (Patient not taking: Reported on 8/8/2022)      pravastatin (PRAVACHOL) 80 mg tablet Take 80 mg by mouth nightly. (Patient not taking: Reported on 7/5/2022)      sodium zirconium cyclosilicate (Lokelma) 10 gram powder packet Take 10 g by mouth daily. (Patient not taking: Reported on 5/12/2022)      tacrolimus (PROGRAF) 1 mg capsule Take 1 mg by mouth two (2) times a day. (Patient not taking: Reported on 7/5/2022)      mycophenolate (CELLCEPT) 500 mg tablet Take 500 mg by mouth two (2) times a day. Take 2 tablets by mouth twice a day (Patient not taking: Reported on 8/8/2022)      compr.stocking,knee,long,large (COMP STOCKING,KNEE,LONG,LARGE) misc Wear on the right leg for swelling. Goal 20-40 mmhg of pressure. 1 Package 1    predniSONE (DELTASONE) 5 mg tablet Take 2.5 mg by mouth daily. (Patient not taking: Reported on 7/5/2022)       No current facility-administered medications on file prior to visit.        Patient Active Problem List Diagnosis Code    Coronary artery disease I25.10    Deafness H91.90    HTN (hypertension) I10    Hx of right BKA ([facility]) Z89.511    End stage renal disease on dialysis ([facility] N18.6, Z99.2    Anemia due to chronic kidney disease N18.9, D63.1    CAD (coronary artery disease) I25.10    DM type 2 causing renal disease ([facility]) E11.29    Hyperlipidemia E78.5    GERD (gastroesophageal reflux disease) K21.9    Gout M10.9    Anemia D64.9    Dizziness R42    PAD (peripheral artery disease) ([facility]) I73.9    Kidney replaced by transplant Z94.0    Controlled substance agreement signed Z79.899       Social History     Socioeconomic History    Marital status: SINGLE   Tobacco Use    Smoking status: Passive Smoke Exposure - Never Smoker    Smokeless tobacco: Never   Substance and Sexual Activity    Alcohol use: Yes     Alcohol/week: 4.2 standard drinks     Types: 5 Cans of beer per week    Drug use: No    Sexual activity: Not Currently     Partners: Female        Review of Systems   Review of Systems   Constitutional:  Negative for chills and fever. Eyes:  Negative for blurred vision and double vision. Cardiovascular:  Negative for chest pain, palpitations and orthopnea. Genitourinary:  Negative for dysuria, frequency and urgency. Neurological:  Negative for dizziness, tingling and headaches. Objective:   Visit Vitals  BP (!) 169/89 (BP 1 Location: Right arm, BP Patient Position: Sitting, BP Cuff Size: Large adult)   Pulse 85   Temp 98.2 °F (36.8 °C) (Oral)   Resp 18   Ht 5' 11\" (1.803 m)   Wt 220 lb (99.8 kg)   SpO2 97%   BMI 30.68 kg/m²        Physical Exam  Vitals reviewed. Constitutional:       Appearance: Normal appearance. HENT:      Head: Normocephalic and atraumatic. Cardiovascular:      Rate and Rhythm: Normal rate and regular rhythm. Pulses: Normal pulses. Heart sounds: Murmur heard. Pulmonary:      Effort: Pulmonary effort is normal.      Breath sounds: Normal breath sounds.    Abdominal: General: Abdomen is flat. Bowel sounds are normal.      Palpations: Abdomen is soft. Musculoskeletal:      Cervical back: Normal range of motion and neck supple. Comments: Left Hand: Unable to close fist, tender in the median nerve distribution. Noting reduced muscle mass in left hand. Neurological:      Mental Status: He is alert. Sensory: Sensory deficit present. Pertinent Labs/Studies:      Assessment and orders:       ICD-10-CM ICD-9-CM    1. Carpal tunnel syndrome of left wrist  G56.02 354.0 REFERRAL TO ORTHOPEDICS      oxyCODONE IR (ROXICODONE) 5 mg immediate release tablet      2. Peritoneal dialysis status ([facility] [address]  Z99.2 V45.11       3. Bilateral deafness  H91.93 389.9 SIGN LANG/ORAL         Diagnoses and all orders for this visit:    1. Carpal tunnel syndrome of left wrist: Urgent referral to Hand surgeon, given correct contact number  Trial of low dose Oxycodone, discussed taking lowest possible dose and titrate up.  -     REFERRAL TO ORTHOPEDICS  -     oxyCODONE IR (ROXICODONE) 5 mg immediate release tablet; Take 1 Tablet by mouth two (2) times a day for 30 days. Max Daily Amount: 10 mg.    2. Peritoneal dialysis status ([facility] [address]      Follow-up and Dispositions    Return in about 4 weeks (around 9/5/2022). I have discussed the diagnosis with the patient and the intended plan as seen in the above orders. Social history, medical history, and labs were reviewed. The patient has received an after-visit summary and questions were answered concerning future plans. I have discussed medication side effects and warnings with the patient as well.     MD VIVIEN Barnes [facility]  08/08/22

## 2022-08-08 NOTE — PROGRESS NOTES
1. Have you been to the ER, urgent care clinic since your last visit? Hospitalized since your last visit? No    2. Have you seen or consulted any other health care providers outside of the [address] [place] [address] since your last visit? Including any pap smears or colon screening.  No      Health Maintenance Due   Topic Date Due    Shingrix Vaccine Age 49> (1 of 2) Never done    DTaP/Tdap/Td series (1 - Tdap) Never done    Colorectal Cancer Screening Combo  Never done    Foot Exam Q1  04/15/2020    Pneumococcal 0-64 years (3 - PPSV23 or PCV20) 07/01/2020    Eye Exam Retinal or Dilated  02/14/2021    COVID-19 Vaccine (4 - Booster for Pfizer series) 12/13/2021

## 2022-09-07 ENCOUNTER — OFFICE VISIT (OUTPATIENT)
Dept: FAMILY MEDICINE CLINIC | Age: 57
End: 2022-09-07
Payer: MEDICARE

## 2022-09-07 VITALS
HEART RATE: 87 BPM | SYSTOLIC BLOOD PRESSURE: 145 MMHG | DIASTOLIC BLOOD PRESSURE: 85 MMHG | TEMPERATURE: 97.7 F | BODY MASS INDEX: 30.94 KG/M2 | OXYGEN SATURATION: 98 % | WEIGHT: 221 LBS | RESPIRATION RATE: 20 BRPM | HEIGHT: 71 IN

## 2022-09-07 DIAGNOSIS — E11.22 TYPE 2 DIABETES MELLITUS WITH CHRONIC KIDNEY DISEASE, WITH LONG-TERM CURRENT USE OF INSULIN, UNSPECIFIED CKD STAGE (HCC): Primary | ICD-10-CM

## 2022-09-07 DIAGNOSIS — N18.6 END STAGE RENAL DISEASE ON DIALYSIS (HCC): ICD-10-CM

## 2022-09-07 DIAGNOSIS — H91.93 BILATERAL DEAFNESS: ICD-10-CM

## 2022-09-07 DIAGNOSIS — Z12.11 COLON CANCER SCREENING: ICD-10-CM

## 2022-09-07 DIAGNOSIS — Z89.511 S/P BKA (BELOW KNEE AMPUTATION), RIGHT (HCC): ICD-10-CM

## 2022-09-07 DIAGNOSIS — G56.02 CARPAL TUNNEL SYNDROME OF LEFT WRIST: ICD-10-CM

## 2022-09-07 DIAGNOSIS — Z79.4 TYPE 2 DIABETES MELLITUS WITH CHRONIC KIDNEY DISEASE, WITH LONG-TERM CURRENT USE OF INSULIN, UNSPECIFIED CKD STAGE (HCC): Primary | ICD-10-CM

## 2022-09-07 DIAGNOSIS — Z99.2 END STAGE RENAL DISEASE ON DIALYSIS (HCC): ICD-10-CM

## 2022-09-07 PROCEDURE — 3044F HG A1C LEVEL LT 7.0%: CPT | Performed by: FAMILY MEDICINE

## 2022-09-07 PROCEDURE — G8432 DEP SCR NOT DOC, RNG: HCPCS | Performed by: FAMILY MEDICINE

## 2022-09-07 PROCEDURE — 3017F COLORECTAL CA SCREEN DOC REV: CPT | Performed by: FAMILY MEDICINE

## 2022-09-07 PROCEDURE — 99214 OFFICE O/P EST MOD 30 MIN: CPT | Performed by: FAMILY MEDICINE

## 2022-09-07 PROCEDURE — G8417 CALC BMI ABV UP PARAM F/U: HCPCS | Performed by: FAMILY MEDICINE

## 2022-09-07 PROCEDURE — G8427 DOCREV CUR MEDS BY ELIG CLIN: HCPCS | Performed by: FAMILY MEDICINE

## 2022-09-07 PROCEDURE — 2022F DILAT RTA XM EVC RTNOPTHY: CPT | Performed by: FAMILY MEDICINE

## 2022-09-07 PROCEDURE — G9231 DOC ESRD DIA TRANS PREG: HCPCS | Performed by: FAMILY MEDICINE

## 2022-09-07 NOTE — PATIENT INSTRUCTIONS
Blood Pressure Record     Patient Name:  ______________________ :  ______________________    Date/Time BP Reading Pulse

## 2022-09-07 NOTE — PROGRESS NOTES
[facility]    History of Present Illness:   Jagjit Webster is a 62 y.o. male with history of CAD, HTN, PAD, GERD, DDM, ESRD, Deafness, HLD, Gout  CC: Left Hand Pain  History provided by patient and Records    HPI:  Patient has seen ortho and has plans to return in 2 weeks for repeat injection of the left carpal tunnel and to deal with right trigger finger. Noting significant improvement in pain and has backed off of Oxicodone. Diabetes Follow up: Overall the patient feels well with good energy level. Current Medications:   Key Antihyperglycemic Medications               insulin glargine (LANTUS,BASAGLAR) 100 unit/mL (3 mL) inpn (Taking) 50 Units by SubCUTAneous route daily. insulin aspart U-100 (NOVOLOG) 100 unit/mL (3 mL) inpn (Taking) 5 Units by SubCUTAneous route Before breakfast, lunch, and dinner. .   Insulin dependence: YES   Frequency of home glucose testing: Daily   Blood Sugar range at home: <200    Last eye exam: In past 12 months. Last foot exam: This year.    Polyuria, polyphagia or polydipsia: NO   Retinopathy: NO   Neuropathy SX: NO   Low blood sugar symptoms: YES   Dietary compliance: compliant most of the time   Medication compliance: compliant most of the time   On ASA: NO   Tobacco Use: NO   Depression: NO     Wt Readings from Last 3 Encounters:   09/07/22 221 lb (100.2 kg)   08/08/22 220 lb (99.8 kg)   07/05/22 218 lb (98.9 kg)        Lab Results   Component Value Date/Time    Hemoglobin A1c 5.4 02/15/2022 02:15 PM    Hemoglobin A1c (POC) 8.0 04/16/2019 10:23 AM        Lab Results   Component Value Date/Time    Microalb/Creat ratio (ug/mg creat.) 1,103.9 (H) 04/16/2019 02:21 PM         Lab Results   Component Value Date/Time    Creatinine (POC) 5.7 (H) 08/14/2013 11:20 PM    Creatinine 5.85 (H) 02/15/2022 02:15 PM      Health Maintenance  Health Maintenance Due   Topic Date Due    Shingrix Vaccine Age 50> (1 of 2) Never done    DTaP/Tdap/Td series (1 - Tdap) Never done    Colorectal Cancer Screening Combo  Never done    Pneumococcal 0-64 years (3 - PPSV23 or PCV20) 07/01/2020    Eye Exam Retinal or Dilated  02/14/2021    COVID-19 Vaccine (4 - Booster for Pfizer series) 12/13/2021    Flu Vaccine (1) 09/01/2022       Past Medical, Family, and Social History:     Current Outpatient Medications on File Prior to Visit   Medication Sig Dispense Refill    oxyCODONE IR (ROXICODONE) 5 mg immediate release tablet Take 1 Tablet by mouth two (2) times a day for 30 days. Max Daily Amount: 10 mg. 60 Tablet 0    insulin glargine (LANTUS,BASAGLAR) 100 unit/mL (3 mL) inpn 50 Units by SubCUTAneous route daily. 15 mL 5    insulin aspart U-100 (NOVOLOG) 100 unit/mL (3 mL) inpn 5 Units by SubCUTAneous route Before breakfast, lunch, and dinner. 15 mL 3    albuterol (PROVENTIL HFA, VENTOLIN HFA, PROAIR HFA) 90 mcg/actuation inhaler Take 2 Puffs by inhalation every four (4) hours as needed for Wheezing. hydrALAZINE (APRESOLINE) 50 mg tablet Take 25 mg by mouth three (3) times daily. sodium bicarbonate 650 mg tablet Take 1,300 mg by mouth two (2) times daily (with meals). dexlansoprazole (DEXILANT) 60 mg CpDB capsule (delayed release) Take 30 mg by mouth daily (with breakfast). gabapentin (NEURONTIN) 300 mg capsule Take 1 Capsule by mouth daily. Max Daily Amount: 300 mg. Start by taking after Dialysis for the first week. (Patient not taking: Reported on 8/8/2022) 30 Capsule 0    naloxone (NARCAN) 4 mg/actuation nasal spray Use 1 spray intranasally, then discard. Repeat with new spray every 2 min as needed for opioid overdose symptoms, alternating nostrils. (Patient not taking: Reported on 9/7/2022) 2 Each 1    Insulin Needles, Disposable, 31 gauge x 5/16\" ndle Use with insulin as directed 200 Pen Needle 11    carvediloL (COREG) 6.25 mg tablet Take 6.25 mg by mouth two (2) times a day.  (Patient not taking: No sig reported)      cholecalciferol (VITAMIN D3) (1000 Units /25 mcg) tablet Take 2,000 Units by mouth daily. (Patient not taking: Reported on 8/8/2022)      NIFEdipine ER (PROCARDIA XL) 60 mg ER tablet Take 60 mg by mouth daily. (Patient not taking: Reported on 8/8/2022)      pravastatin (PRAVACHOL) 80 mg tablet Take 80 mg by mouth nightly. (Patient not taking: Reported on 7/5/2022)      sodium zirconium cyclosilicate (Lokelma) 10 gram powder packet Take 10 g by mouth daily. (Patient not taking: Reported on 5/12/2022)      tacrolimus (PROGRAF) 1 mg capsule Take 1 mg by mouth two (2) times a day. (Patient not taking: Reported on 7/5/2022)      mycophenolate (CELLCEPT) 500 mg tablet Take 500 mg by mouth two (2) times a day. Take 2 tablets by mouth twice a day (Patient not taking: Reported on 8/8/2022)      compr.stocking,knee,long,large (COMP STOCKING,KNEE,LONG,LARGE) misc Wear on the right leg for swelling. Goal 20-40 mmhg of pressure. 1 Package 1    predniSONE (DELTASONE) 5 mg tablet Take 2.5 mg by mouth daily. (Patient not taking: Reported on 7/5/2022)       No current facility-administered medications on file prior to visit. Patient Active Problem List   Diagnosis Code    Coronary artery disease I25.10    Deafness H91.90    HTN (hypertension) I10    S/P BKA (below knee amputation), right ([facility]) Z89.511    End stage renal disease on dialysis ([facility] N18.6, Z99.2    Anemia due to chronic kidney disease N18.9, D63.1    CAD (coronary artery disease) I25.10    DM type 2 causing renal disease ([facility]) E11.29    Hyperlipidemia E78.5    GERD (gastroesophageal reflux disease) K21.9    Gout M10.9    Anemia D64.9    Dizziness R42    PAD (peripheral artery disease) ([facility]) I73.9    Kidney replaced by transplant Z94.0    Controlled substance agreement signed Z79.899       Social History     Socioeconomic History    Marital status: SINGLE   Tobacco Use    Smoking status: Never     Passive exposure: Yes    Smokeless tobacco: Never   Substance and Sexual Activity    Alcohol use:  Yes Alcohol/week: 4.2 standard drinks     Types: 5 Cans of beer per week    Drug use: No    Sexual activity: Not Currently     Partners: Female        Review of Systems   Review of Systems   Constitutional:  Negative for chills and fever. Cardiovascular:  Negative for chest pain and palpitations. Gastrointestinal:  Negative for nausea and vomiting. Genitourinary:  Negative for dysuria and urgency. Objective:   Visit Vitals  BP (!) 145/85   Pulse 87   Temp 97.7 °F (36.5 °C)   Resp 20   Ht 5' 11\" (1.803 m)   Wt 221 lb (100.2 kg)   SpO2 98%   BMI 30.82 kg/m²        Physical Exam  Vitals and nursing note reviewed. Constitutional:       Appearance: Normal appearance. HENT:      Head: Normocephalic and atraumatic. Cardiovascular:      Rate and Rhythm: Normal rate and regular rhythm. Pulses: Normal pulses. Heart sounds: Normal heart sounds. Pulmonary:      Effort: Pulmonary effort is normal.      Breath sounds: Normal breath sounds. Abdominal:      General: Abdomen is flat. Bowel sounds are normal.      Palpations: Abdomen is soft. Musculoskeletal:         General: Normal range of motion. Cervical back: Normal range of motion and neck supple. Skin:     General: Skin is warm and dry. Neurological:      Mental Status: He is alert. Diabetic foot exam:     Left Foot:   Visual Exam: normal    Pulse DP: 1+ (weak)   Filament test: normal sensation    Vibratory sensation: normal      Right Foot: BKA         Pertinent Labs/Studies:      Assessment and orders:       ICD-10-CM ICD-9-CM    1. Type 2 diabetes mellitus with chronic kidney disease, with long-term current use of insulin, unspecified CKD stage ([facility]  E11.22 250.40     Z79.4 585. 9      V58.67       2. End stage renal disease on dialysis (HCC)  N18.6 585.6     Z99.2 V45.11       3. Bilateral deafness  H91.93 389.9       4. S/P BKA (below knee amputation), right ([facility]  Z89.511 V49.75       5.  Colon cancer screening  Z12.11 V76.51 REFERRAL TO GASTROENTEROLOGY      6. Carpal tunnel syndrome of left wrist  G56.02 354.0         Diagnoses and all orders for this visit:    1. Type 2 diabetes mellitus with chronic kidney disease, with long-term current use of insulin, unspecified CKD stage [facility]): Discussed with patient today that the goal for their diabetes is to have a HgA1C<7 and ideally as close to 6.5 as possible. We discussed diet and medications. The goal for the cholesterol LDL is less than 70 and HDL>40. Patient is aware of the need for yearly eye exams and to take care of their feet daily. Discussed with patient that blood pressure should be less than 130/80 and watching salt intake is very important. 2. End stage renal disease on dialysis [facility]): Doing well with peritoneal Dialysis now. 3. Bilateral deafness    4. S/P BKA (below knee amputation), right ([address]): Need to find Ortho from [employer] to get setup to review prosthetic. 5. Colon cancer screening  -     REFERRAL TO GASTROENTEROLOGY    6. Carpal tunnel syndrome of left wrist: Carpal tunnel is improved, not needing pain medications regularly and self weaned. Follow-up and Dispositions    Return in about 3 months (around 12/7/2022). I have discussed the diagnosis with the patient and the intended plan as seen in the above orders. Social history, medical history, and labs were reviewed. The patient has received an after-visit summary and questions were answered concerning future plans. I have discussed medication side effects and warnings with the patient as well.     MD VIVIEN Koroma & [facility]  09/07/22

## 2022-09-07 NOTE — PROGRESS NOTES
1. Have you been to the ER, urgent care clinic since your last visit? Hospitalized since your last visit? No    2. Have you seen or consulted any other health care providers outside of the [address] [place] [address] since your last visit? Include any pap smears or colon screening. No  Reviewed record in preparation for visit and have necessary documentation  Pt did not bring medication to office visit for review  opportunity was given for questions      3. For patients aged 39-70: Has the patient had a colonoscopy / FIT/ Cologuard? No      If the patient is female:    4. For patients aged 41-77: Has the patient had a mammogram within the past 2 years? NA - based on age or sex      11. For patients aged 21-65: Has the patient had a pap smear?  NA - based on age or sex      Goals that were addressed and/or need to be completed during or after this appointment include   Health Maintenance Due   Topic Date Due    Shingrix Vaccine Age 49> (1 of 2) Never done    DTaP/Tdap/Td series (1 - Tdap) Never done    Colorectal Cancer Screening Combo  Never done    Foot Exam Q1  04/15/2020    Pneumococcal 0-64 years (3 - PPSV23 or PCV20) 07/01/2020    Eye Exam Retinal or Dilated  02/14/2021    COVID-19 Vaccine (4 - Booster for Marquez Peter series) 12/13/2021    Flu Vaccine (1) 09/01/2022

## 2022-09-30 RX ORDER — DEXLANSOPRAZOLE 60 MG/1
60 CAPSULE, DELAYED RELEASE ORAL
Qty: 90 CAPSULE | Refills: 1 | Status: SHIPPED | OUTPATIENT
Start: 2022-09-30

## 2022-10-14 RX ORDER — SODIUM BICARBONATE 650 MG/1
1300 TABLET ORAL 2 TIMES DAILY WITH MEALS
Qty: 180 TABLET | Refills: 1 | Status: SHIPPED | OUTPATIENT
Start: 2022-10-14

## 2022-12-12 ENCOUNTER — OFFICE VISIT (OUTPATIENT)
Dept: FAMILY MEDICINE CLINIC | Age: 57
End: 2022-12-12
Payer: MEDICARE

## 2022-12-12 VITALS
TEMPERATURE: 97.8 F | HEIGHT: 71 IN | WEIGHT: 219.6 LBS | RESPIRATION RATE: 18 BRPM | OXYGEN SATURATION: 100 % | BODY MASS INDEX: 30.74 KG/M2 | HEART RATE: 91 BPM | DIASTOLIC BLOOD PRESSURE: 87 MMHG | SYSTOLIC BLOOD PRESSURE: 154 MMHG

## 2022-12-12 DIAGNOSIS — N18.6 ANEMIA DUE TO CHRONIC KIDNEY DISEASE, ON CHRONIC DIALYSIS (HCC): ICD-10-CM

## 2022-12-12 DIAGNOSIS — Z89.511 S/P BKA (BELOW KNEE AMPUTATION), RIGHT (HCC): ICD-10-CM

## 2022-12-12 DIAGNOSIS — Z99.2 ANEMIA DUE TO CHRONIC KIDNEY DISEASE, ON CHRONIC DIALYSIS (HCC): ICD-10-CM

## 2022-12-12 DIAGNOSIS — Z79.4 TYPE 2 DIABETES MELLITUS WITH CHRONIC KIDNEY DISEASE, WITH LONG-TERM CURRENT USE OF INSULIN, UNSPECIFIED CKD STAGE (HCC): ICD-10-CM

## 2022-12-12 DIAGNOSIS — I25.118 CORONARY ARTERY DISEASE OF NATIVE HEART WITH STABLE ANGINA PECTORIS, UNSPECIFIED VESSEL OR LESION TYPE (HCC): Primary | ICD-10-CM

## 2022-12-12 DIAGNOSIS — E11.22 TYPE 2 DIABETES MELLITUS WITH CHRONIC KIDNEY DISEASE, WITH LONG-TERM CURRENT USE OF INSULIN, UNSPECIFIED CKD STAGE (HCC): ICD-10-CM

## 2022-12-12 DIAGNOSIS — D63.1 ANEMIA DUE TO CHRONIC KIDNEY DISEASE, ON CHRONIC DIALYSIS (HCC): ICD-10-CM

## 2022-12-12 DIAGNOSIS — E78.2 MIXED HYPERLIPIDEMIA: ICD-10-CM

## 2022-12-12 DIAGNOSIS — N18.6 END STAGE RENAL DISEASE ON DIALYSIS (HCC): ICD-10-CM

## 2022-12-12 DIAGNOSIS — Z99.2 END STAGE RENAL DISEASE ON DIALYSIS (HCC): ICD-10-CM

## 2022-12-12 PROCEDURE — G8432 DEP SCR NOT DOC, RNG: HCPCS | Performed by: FAMILY MEDICINE

## 2022-12-12 PROCEDURE — 3074F SYST BP LT 130 MM HG: CPT | Performed by: FAMILY MEDICINE

## 2022-12-12 PROCEDURE — 99214 OFFICE O/P EST MOD 30 MIN: CPT | Performed by: FAMILY MEDICINE

## 2022-12-12 PROCEDURE — 2022F DILAT RTA XM EVC RTNOPTHY: CPT | Performed by: FAMILY MEDICINE

## 2022-12-12 PROCEDURE — G9231 DOC ESRD DIA TRANS PREG: HCPCS | Performed by: FAMILY MEDICINE

## 2022-12-12 PROCEDURE — 3078F DIAST BP <80 MM HG: CPT | Performed by: FAMILY MEDICINE

## 2022-12-12 PROCEDURE — G8427 DOCREV CUR MEDS BY ELIG CLIN: HCPCS | Performed by: FAMILY MEDICINE

## 2022-12-12 PROCEDURE — 3044F HG A1C LEVEL LT 7.0%: CPT | Performed by: FAMILY MEDICINE

## 2022-12-12 PROCEDURE — 3017F COLORECTAL CA SCREEN DOC REV: CPT | Performed by: FAMILY MEDICINE

## 2022-12-12 PROCEDURE — G8417 CALC BMI ABV UP PARAM F/U: HCPCS | Performed by: FAMILY MEDICINE

## 2022-12-12 RX ORDER — CINACALCET 30 MG/1
30 TABLET, FILM COATED ORAL DAILY
COMMUNITY

## 2022-12-12 RX ORDER — LOSARTAN POTASSIUM 50 MG/1
50 TABLET ORAL DAILY
COMMUNITY
Start: 2022-11-10 | End: 2022-12-12 | Stop reason: SDUPTHER

## 2022-12-12 RX ORDER — LOSARTAN POTASSIUM 50 MG/1
50 TABLET ORAL DAILY
Qty: 90 TABLET | Refills: 1 | Status: SHIPPED | OUTPATIENT
Start: 2022-12-12

## 2022-12-12 RX ORDER — NIFEDIPINE 60 MG/1
60 TABLET, EXTENDED RELEASE ORAL DAILY
Qty: 90 TABLET | Refills: 1 | Status: SHIPPED | OUTPATIENT
Start: 2022-12-12

## 2022-12-12 RX ORDER — CALCITRIOL 0.5 UG/1
0.5 CAPSULE ORAL DAILY
COMMUNITY

## 2022-12-12 NOTE — PROGRESS NOTES
1. Have you been to the ER, urgent care clinic since your last visit? Hospitalized since your last visit? No    2. Have you seen or consulted any other health care providers outside of the [address] [place] [address] since your last visit? Including any pap smears or colon screening.  No      Health Maintenance Due   Topic Date Due    Hepatitis B Vaccine (1 of 3 - Risk 3-dose series) Never done    Shingrix Vaccine Age 50> (1 of 2) Never done    DTaP/Tdap/Td series (1 - Tdap) Never done    Colorectal Cancer Screening Combo  Never done    Pneumococcal 0-64 years (3 - PPSV23 if available, else PCV20) 07/01/2020    Eye Exam Retinal or Dilated  02/14/2021    COVID-19 Vaccine (4 - Booster for Marquez Peter series) 11/15/2021    Flu Vaccine (1) 08/01/2022

## 2022-12-12 NOTE — PROGRESS NOTES
[facility]    History of Present Illness:   Shirley Dillard is a 62 y.o. male with history of CAD, HTN, PAD, GERD, DDM, ESRD, Deafness, HLD, Gout  CC: Follow up Chronic conditions  History provided by patient and Records    HPI:  Patient has had 2 injections on the left hand and has upcoming EMG testing for the hand as well. Not taking Gabapentin or Oxycodone. CAD: Patient has had echocardiogram, and stress test, has Cath in 2 days. Diabetes Follow up: Overall the patient feels well with good energy level. Current Medications:   Key Antihyperglycemic Medications               insulin glargine (LANTUS,BASAGLAR) 100 unit/mL (3 mL) inpn (Taking) 50 Units by SubCUTAneous route daily. insulin aspart U-100 (NOVOLOG) 100 unit/mL (3 mL) inpn (Taking) 5 Units by SubCUTAneous route Before breakfast, lunch, and dinner. .   Insulin dependence: NO   Frequency of home glucose testing: Daily   Blood Sugar range at home: Controlled    Last eye exam: In past 12 months. Last foot exam: This year. Polyuria, polyphagia or polydipsia: NO   Retinopathy: NO   Neuropathy SX: YES   Low blood sugar symptoms: NO   Dietary compliance: compliant most of the time   Medication compliance: compliant most of the time   On ASA: YES   Tobacco Use: NO   Depression: NO     Wt Readings from Last 3 Encounters:   12/12/22 219 lb 9.6 oz (99.6 kg)   09/07/22 221 lb (100.2 kg)   08/08/22 220 lb (99.8 kg)        Lab Results   Component Value Date/Time    Hemoglobin A1c 5.4 02/15/2022 02:15 PM    Hemoglobin A1c (POC) 8.0 04/16/2019 10:23 AM        Lab Results   Component Value Date/Time    Microalb/Creat ratio (ug/mg creat.) 1,103.9 (H) 04/16/2019 02:21 PM         Lab Results   Component Value Date/Time    Creatinine (POC) 5.7 (H) 08/14/2013 11:20 PM    Creatinine 5.85 (H) 02/15/2022 02:15 PM      ESRD: Continuing Peritoneal dialysis, doing well at this time. Patient states he is urinating.     Right BKA: Patient is noting having moderate to severe pain in the right leg due to Prosthetic for BKA not fitting appropriately. Originally fitted at [facility] [place] and since then noting worsening fit. Health Maintenance  Health Maintenance Due   Topic Date Due    Hepatitis B Vaccine (1 of 3 - Risk 3-dose series) Never done    Shingrix Vaccine Age 50> (1 of 2) Never done    DTaP/Tdap/Td series (1 - Tdap) Never done    Colorectal Cancer Screening Combo  Never done    Pneumococcal 0-64 years (3 - PPSV23 if available, else PCV20) 07/01/2020    Eye Exam Retinal or Dilated  02/14/2021    COVID-19 Vaccine (4 - Booster for Qpyn series) 11/15/2021    Flu Vaccine (1) 08/01/2022       Past Medical, Family, and Social History:     Current Outpatient Medications on File Prior to Visit   Medication Sig Dispense Refill    calcitRIOL (ROCALTROL) 0.5 mcg capsule Take 0.5 mcg by mouth daily. cinacalcet (SENSIPAR) 30 mg tablet Take 30 mg by mouth daily. sodium bicarbonate 650 mg tablet Take 2 Tablets by mouth two (2) times daily (with meals). 180 Tablet 1    dexlansoprazole (DEXILANT) 60 mg CpDB capsule (delayed release) Take 1 Capsule by mouth daily (with breakfast). 90 Capsule 1    naloxone (NARCAN) 4 mg/actuation nasal spray Use 1 spray intranasally, then discard. Repeat with new spray every 2 min as needed for opioid overdose symptoms, alternating nostrils. 2 Each 1    insulin glargine (LANTUS,BASAGLAR) 100 unit/mL (3 mL) inpn 50 Units by SubCUTAneous route daily. 15 mL 5    insulin aspart U-100 (NOVOLOG) 100 unit/mL (3 mL) inpn 5 Units by SubCUTAneous route Before breakfast, lunch, and dinner. 15 mL 3    Insulin Needles, Disposable, 31 gauge x 5/16\" ndle Use with insulin as directed 200 Pen Needle 11    albuterol (PROVENTIL HFA, VENTOLIN HFA, PROAIR HFA) 90 mcg/actuation inhaler Take 2 Puffs by inhalation every four (4) hours as needed for Wheezing.       carvediloL (COREG) 6.25 mg tablet Take 6.25 mg by mouth two (2) times a day.      cholecalciferol (VITAMIN D3) (1000 Units /25 mcg) tablet Take 2,000 Units by mouth daily. hydrALAZINE (APRESOLINE) 50 mg tablet Take 25 mg by mouth three (3) times daily. tacrolimus (PROGRAF) 1 mg capsule Take 1 mg by mouth two (2) times a day. mycophenolate (CELLCEPT) 500 mg tablet Take 500 mg by mouth two (2) times a day. Take 2 tablets by mouth twice a day      compr.stocking,knee,long,large (COMP STOCKING,KNEE,LONG,LARGE) misc Wear on the right leg for swelling. Goal 20-40 mmhg of pressure. 1 Package 1    [DISCONTINUED] losartan (COZAAR) 50 mg tablet Take 50 mg by mouth daily. [DISCONTINUED] gabapentin (NEURONTIN) 300 mg capsule Take 1 Capsule by mouth daily. Max Daily Amount: 300 mg. Start by taking after Dialysis for the first week. (Patient not taking: No sig reported) 30 Capsule 0    sodium zirconium cyclosilicate (Lokelma) 10 gram powder packet Take 10 g by mouth daily. (Patient not taking: Reported on 5/12/2022)      [DISCONTINUED] NIFEdipine ER (PROCARDIA XL) 60 mg ER tablet Take 60 mg by mouth daily. [DISCONTINUED] pravastatin (PRAVACHOL) 80 mg tablet Take 80 mg by mouth nightly. (Patient not taking: No sig reported)      [DISCONTINUED] predniSONE (DELTASONE) 5 mg tablet Take 2.5 mg by mouth daily. (Patient not taking: No sig reported)       No current facility-administered medications on file prior to visit.        Patient Active Problem List   Diagnosis Code    Coronary artery disease I25.10    Deafness H91.90    HTN (hypertension) I10    S/P BKA (below knee amputation), right ([facility]) Z89.511    End stage renal disease on dialysis ([facility] N18.6, Z99.2    Anemia due to chronic kidney disease N18.9, D63.1    CAD (coronary artery disease) I25.10    DM type 2 causing renal disease ([facility]) E11.29    Hyperlipidemia E78.5    GERD (gastroesophageal reflux disease) K21.9    Gout M10.9    Anemia D64.9    Dizziness R42    PAD (peripheral artery disease) ([facility]) I73.9    Kidney replaced by transplant Z94.0    Controlled substance agreement signed Z79.899       Social History     Socioeconomic History    Marital status: SINGLE   Tobacco Use    Smoking status: Never     Passive exposure: Yes    Smokeless tobacco: Never   Substance and Sexual Activity    Alcohol use: Yes     Alcohol/week: 4.2 standard drinks     Types: 5 Cans of beer per week    Drug use: No    Sexual activity: Not Currently     Partners: Female        Review of Systems   Review of Systems   Constitutional:  Negative for chills, fever and weight loss. Cardiovascular:  Negative for chest pain, palpitations and orthopnea. Gastrointestinal:  Negative for abdominal pain, nausea and vomiting. Neurological:  Negative for dizziness, tingling and headaches. Psychiatric/Behavioral:  Negative for depression, substance abuse and suicidal ideas. Objective:   Visit Vitals  BP (!) 154/87 (BP 1 Location: Right arm, BP Patient Position: Sitting, BP Cuff Size: Large adult)   Pulse 91   Temp 97.8 °F (36.6 °C) (Oral)   Resp 18   Ht 5' 11\" (1.803 m)   Wt 219 lb 9.6 oz (99.6 kg)   SpO2 100%   BMI 30.63 kg/m²        Physical Exam  Vitals and nursing note reviewed. Constitutional:       Appearance: Normal appearance. HENT:      Head: Normocephalic and atraumatic. Cardiovascular:      Rate and Rhythm: Normal rate and regular rhythm. Pulses: Normal pulses. Heart sounds: Normal heart sounds. Pulmonary:      Effort: Pulmonary effort is normal.      Breath sounds: Normal breath sounds. Abdominal:      General: Abdomen is flat. Bowel sounds are normal.      Palpations: Abdomen is soft. Musculoskeletal:         General: Normal range of motion. Cervical back: Normal range of motion and neck supple. Skin:     General: Skin is warm and dry. Neurological:      Mental Status: He is alert. Pertinent Labs/Studies:      Assessment and orders:       ICD-10-CM ICD-9-CM    1.  Coronary artery disease of native heart with stable angina pectoris, unspecified vessel or lesion type ([facility]  I25.118 414.01 losartan (COZAAR) 50 mg tablet     413.9 NIFEdipine ER (PROCARDIA XL) 60 mg ER tablet      2. Type 2 diabetes mellitus with chronic kidney disease, with long-term current use of insulin, unspecified CKD stage ([facility]  E11.22 250.40     Z79.4 585. 9      V58.67       3. End stage renal disease on dialysis (HCC)  N18.6 585.6     Z99.2 V45.11       4. Mixed hyperlipidemia  E78.2 272.2       5. Anemia due to chronic kidney disease, on chronic dialysis ([facility])  N18.6 585.6     D63.1 285.21     Z99.2 V45.11       6. S/P BKA (below knee amputation), right ([facility]  Z89.511 V49.75         Diagnoses and all orders for this visit:    1. Coronary artery disease of native heart with stable angina pectoris, unspecified vessel or lesion type [facility]): Follow up upcoming test  -     losartan (COZAAR) 50 mg tablet; Take 1 Tablet by mouth daily.  -     NIFEdipine ER (PROCARDIA XL) 60 mg ER tablet; Take 1 Tablet by mouth daily. 2. Type 2 diabetes mellitus with chronic kidney disease, with long-term current use of insulin, unspecified CKD stage [facility]): Discussed with patient today that the goal for their diabetes is to have a HgA1C<7 and ideally as close to 6.5 as possible. We discussed diet and medications. The goal for the cholesterol LDL is less than 70 and HDL>40. Patient is aware of the need for yearly eye exams and to take care of their feet daily. Discussed with patient that blood pressure should be less than 130/80 and watching salt intake is very important. 3. End stage renal disease on dialysis [facility]): Peritoneal dialysis    4. Mixed hyperlipidemia: The patient is aware of our goal to reduce or eliminate the long term problems (such as strokes and heart attacks) related to poorly controlled Triglycerides, LDL, Cholesterol. 5. Anemia due to chronic kidney disease, on chronic dialysis ([place] [address].)    6.  S/P BKA (below knee amputation), right [facility]): Will get records and determine who to follow up with for sizing. Follow-up and Dispositions    Return in about 3 months (around 3/12/2023). I have discussed the diagnosis with the patient and the intended plan as seen in the above orders. Social history, medical history, and labs were reviewed. The patient has received an after-visit summary and questions were answered concerning future plans. I have discussed medication side effects and warnings with the patient as well.     MD VIVIEN Sevilla & [facility]  12/12/22

## 2023-01-10 ENCOUNTER — TELEPHONE (OUTPATIENT)
Dept: FAMILY MEDICINE CLINIC | Age: 58
End: 2023-01-10

## 2023-01-26 ENCOUNTER — OFFICE VISIT (OUTPATIENT)
Dept: FAMILY MEDICINE CLINIC | Age: 58
End: 2023-01-26
Payer: MEDICAID

## 2023-01-26 VITALS
RESPIRATION RATE: 18 BRPM | OXYGEN SATURATION: 98 % | BODY MASS INDEX: 30.85 KG/M2 | SYSTOLIC BLOOD PRESSURE: 144 MMHG | WEIGHT: 220.4 LBS | HEIGHT: 71 IN | HEART RATE: 94 BPM | TEMPERATURE: 98.4 F | DIASTOLIC BLOOD PRESSURE: 85 MMHG

## 2023-01-26 DIAGNOSIS — Z89.511 S/P BKA (BELOW KNEE AMPUTATION), RIGHT (HCC): ICD-10-CM

## 2023-01-26 DIAGNOSIS — N18.6 END STAGE RENAL DISEASE ON DIALYSIS (HCC): ICD-10-CM

## 2023-01-26 DIAGNOSIS — E11.22 TYPE 2 DIABETES MELLITUS WITH CHRONIC KIDNEY DISEASE, WITH LONG-TERM CURRENT USE OF INSULIN, UNSPECIFIED CKD STAGE (HCC): ICD-10-CM

## 2023-01-26 DIAGNOSIS — M79.604 PAIN OF RIGHT LOWER EXTREMITY: Primary | ICD-10-CM

## 2023-01-26 DIAGNOSIS — Z79.4 TYPE 2 DIABETES MELLITUS WITH CHRONIC KIDNEY DISEASE, WITH LONG-TERM CURRENT USE OF INSULIN, UNSPECIFIED CKD STAGE (HCC): ICD-10-CM

## 2023-01-26 DIAGNOSIS — Z78.9 LANGUAGE BARRIER: ICD-10-CM

## 2023-01-26 DIAGNOSIS — Z99.2 END STAGE RENAL DISEASE ON DIALYSIS (HCC): ICD-10-CM

## 2023-01-26 DIAGNOSIS — I25.118 CORONARY ARTERY DISEASE OF NATIVE HEART WITH STABLE ANGINA PECTORIS, UNSPECIFIED VESSEL OR LESION TYPE (HCC): ICD-10-CM

## 2023-01-26 PROCEDURE — 3077F SYST BP >= 140 MM HG: CPT | Performed by: FAMILY MEDICINE

## 2023-01-26 PROCEDURE — 99214 OFFICE O/P EST MOD 30 MIN: CPT | Performed by: FAMILY MEDICINE

## 2023-01-26 PROCEDURE — 3079F DIAST BP 80-89 MM HG: CPT | Performed by: FAMILY MEDICINE

## 2023-01-26 RX ORDER — LIDOCAINE 50 MG/G
PATCH TOPICAL
Qty: 30 EACH | Refills: 1 | Status: SHIPPED | OUTPATIENT
Start: 2023-01-26

## 2023-01-26 NOTE — PROGRESS NOTES
[address] [facility]    History of Present Illness:   Sharon Patel is a 62 y.o. male with history of CAD, HTN, PAD, GERD, DDM, ESRD, Deafness, HLD, Gout  CC: Follow up, Leg Pain  History provided by patient and Records  : #[id number]    HPI:  Carpal Tunnel/Trigger finger: Surgery on the left hand on February 16th. EMG previously confirmed Carpal Tunnel. CAD: Had a good heart Cath and is doing well at this time. Right Leg/BKA Pain: Patient reports that he is still having persistent right leg pain. Noting pain, bruising at the anterior point of the stub where has pressure on the prosthetic. Patient has noted he lost weight and has had to add padding to the prosthetic to maintain a fit. Patient had prosthetic about 4-5 years ago at this time. Was completed at [facility]. Healthcare Maintenance: Colonoscopy Scheduled for February 6th     Health Maintenance  Health Maintenance Due   Topic Date Due    Hepatitis B Vaccine (1 of 3 - Risk 3-dose series) Never done    Shingles Vaccine (1 of 2) Never done    DTaP/Tdap/Td series (1 - Tdap) Never done    Colorectal Cancer Screening Combo  Never done    Pneumococcal 0-64 years (3 - PPSV23 if available, else PCV20) 07/01/2020    Eye Exam Retinal or Dilated  02/14/2021    COVID-19 Vaccine (4 - Booster for Marquez Peter series) 11/15/2021    Flu Vaccine (1) 08/01/2022       Past Medical, Family, and Social History:     Current Outpatient Medications on File Prior to Visit   Medication Sig Dispense Refill    losartan (COZAAR) 50 mg tablet Take 1 Tablet by mouth daily. 90 Tablet 1    NIFEdipine ER (PROCARDIA XL) 60 mg ER tablet Take 1 Tablet by mouth daily. 90 Tablet 1    calcitRIOL (ROCALTROL) 0.5 mcg capsule Take 0.5 mcg by mouth daily. cinacalcet (SENSIPAR) 30 mg tablet Take 30 mg by mouth daily. dexlansoprazole (DEXILANT) 60 mg CpDB capsule (delayed release) Take 1 Capsule by mouth daily (with breakfast).  90 Capsule 1    insulin glargine (LANTUS,BASAGLAR) 100 unit/mL (3 mL) inpn 50 Units by SubCUTAneous route daily. 15 mL 5    insulin aspart U-100 (NOVOLOG) 100 unit/mL (3 mL) inpn 5 Units by SubCUTAneous route Before breakfast, lunch, and dinner. 15 mL 3    albuterol (PROVENTIL HFA, VENTOLIN HFA, PROAIR HFA) 90 mcg/actuation inhaler Take 2 Puffs by inhalation every four (4) hours as needed for Wheezing. carvediloL (COREG) 6.25 mg tablet Take 6.25 mg by mouth two (2) times a day. hydrALAZINE (APRESOLINE) 50 mg tablet Take 25 mg by mouth three (3) times daily. tacrolimus (PROGRAF) 1 mg capsule Take 1 mg by mouth two (2) times a day. mycophenolate (CELLCEPT) 500 mg tablet Take 500 mg by mouth two (2) times a day. Take 2 tablets by mouth twice a day      sodium bicarbonate 650 mg tablet Take 2 Tablets by mouth two (2) times daily (with meals). (Patient not taking: Reported on 1/26/2023) 180 Tablet 1    naloxone (NARCAN) 4 mg/actuation nasal spray Use 1 spray intranasally, then discard. Repeat with new spray every 2 min as needed for opioid overdose symptoms, alternating nostrils. 2 Each 1    Insulin Needles, Disposable, 31 gauge x 5/16\" ndle Use with insulin as directed 200 Pen Needle 11    cholecalciferol (VITAMIN D3) (1000 Units /25 mcg) tablet Take 2,000 Units by mouth daily. (Patient not taking: Reported on 1/26/2023)      sodium zirconium cyclosilicate (Lokelma) 10 gram powder packet Take 10 g by mouth daily. (Patient not taking: Reported on 5/12/2022)      compr.stocking,knee,long,large (COMP STOCKING,KNEE,LONG,LARGE) misc Wear on the right leg for swelling. Goal 20-40 mmhg of pressure. 1 Package 1     No current facility-administered medications on file prior to visit.        Patient Active Problem List   Diagnosis Code    Coronary artery disease I25.10    Deafness H91.90    HTN (hypertension) I10    S/P BKA (below knee amputation), right (HCC) Z89.511    End stage renal disease on dialysis ([facility] [address] N18.6, Z99.2    Anemia due to chronic kidney disease N18.9, D63.1    CAD (coronary artery disease) I25.10    DM type 2 causing renal disease ([facility]) E11.29    Hyperlipidemia E78.5    GERD (gastroesophageal reflux disease) K21.9    Gout M10.9    Anemia D64.9    Dizziness R42    PAD (peripheral artery disease) ([facility]) I73.9    Kidney replaced by transplant Z94.0    Controlled substance agreement signed Z79.899       Social History     Socioeconomic History    Marital status: SINGLE   Tobacco Use    Smoking status: Never     Passive exposure: Yes    Smokeless tobacco: Never   Substance and Sexual Activity    Alcohol use: Yes     Alcohol/week: 4.2 standard drinks     Types: 5 Cans of beer per week    Drug use: No    Sexual activity: Not Currently     Partners: Female        Review of Systems   Review of Systems   Constitutional:  Negative for chills and fever. Cardiovascular:  Negative for chest pain and palpitations. Neurological:  Negative for dizziness, tingling, tremors and headaches. Objective:   Visit Vitals  BP (!) 144/85 (BP 1 Location: Right arm, BP Patient Position: Sitting, BP Cuff Size: Large adult)   Pulse 94   Temp 98.4 °F (36.9 °C) (Oral)   Resp 18   Ht 5' 11\" (1.803 m)   Wt 220 lb 6.4 oz (100 kg)   SpO2 98%   BMI 30.74 kg/m²        Physical Exam  Vitals and nursing note reviewed. Constitutional:       Appearance: Normal appearance. HENT:      Head: Normocephalic and atraumatic. Cardiovascular:      Rate and Rhythm: Normal rate and regular rhythm. Pulses: Normal pulses. Heart sounds: Normal heart sounds. No murmur heard. No friction rub. No gallop. Pulmonary:      Effort: Pulmonary effort is normal.      Breath sounds: Normal breath sounds. Abdominal:      General: Abdomen is flat. Bowel sounds are normal.      Palpations: Abdomen is soft. Musculoskeletal:      Cervical back: Normal range of motion and neck supple.       Comments: Right Lower Extremity: BKA present, on Stub area of tenderness and mildly darkened skin on the anterior aspect. Feels harder than surrounding skin    Skin:     General: Skin is warm and dry. Neurological:      Mental Status: He is alert. Pertinent Labs/Studies:      Assessment and orders:       ICD-10-CM ICD-9-CM    1. Pain of right lower extremity  M79.604 729.5 REFERRAL TO ORTHOPEDICS      lidocaine (LIDODERM) 5 %      2. S/P BKA (below knee amputation), right ([facility]  Z89.511 V49.75 REFERRAL TO ORTHOPEDICS      3. Coronary artery disease of native heart with stable angina pectoris, unspecified vessel or lesion type ([place] [address]  I25.118 414.01      413.9       4. Type 2 diabetes mellitus with chronic kidney disease, with long-term current use of insulin, unspecified CKD stage ([place] [address].)  E11.22 250.40     Z79.4 585. 9      V58.67       5. End stage renal disease on dialysis (HCC)  N18.6 585.6     Z99.2 V45.11       6. Language barrier  Z78.9 V49.89 SIGN LANG/ORAL         Diagnoses and all orders for this visit:    1. Pain of right lower extremity/S/P BKA (below knee amputation), right [facility]): Referral to get Orthotic revised due to pain. Likely not fitting as well as previously. Needs revision.  -     REFERRAL TO ORTHOPEDICS    3. Coronary artery disease of native heart with stable angina pectoris, unspecified vessel or lesion type [facility]): Good follow up with Cardiology. 4. Type 2 diabetes mellitus with chronic kidney disease, with long-term current use of insulin, unspecified CKD stage [facility]): Discussed with patient today that the goal for their diabetes is to have a HgA1C<7 and ideally as close to 6.5 as possible. We discussed diet and medications. The goal for the cholesterol LDL is less than 70 and HDL>40. Patient is aware of the need for yearly eye exams and to take care of their feet daily. Discussed with patient that blood pressure should be less than 130/80 and watching salt intake is very important.       5. End stage renal disease on dialysis [facility]): Peritoneal dialysis doing well    Follow-up and Dispositions    Return in about 2 months (around 3/26/2023). I have discussed the diagnosis with the patient and the intended plan as seen in the above orders. Social history, medical history, and labs were reviewed. The patient has received an after-visit summary and questions were answered concerning future plans. I have discussed medication side effects and warnings with the patient as well.     MD VIVIEN Franklin & [facility]  01/26/23

## 2023-02-01 ENCOUNTER — TELEPHONE (OUTPATIENT)
Dept: FAMILY MEDICINE CLINIC | Age: 58
End: 2023-02-01

## 2023-02-01 NOTE — TELEPHONE ENCOUNTER
Pt is having a colonoscopy on 2-6-23. Pt needs to know what medications he can take, and what he needs to hold. Please advise.

## 2023-02-02 NOTE — TELEPHONE ENCOUNTER
Reviewed medications of patient. He should continue all of his medications normally until the night before his surgery. On the morning of his surgery he should only do 30 units of his Lantus in the morning, and skip his short acting insulin. They will probably tell him not to take any oral medications, but I would like to have him ask if he can take at least his Losartan, Nifedipine and coreg, otherwise his Blood pressures will be very elevated when he goes to get his colonoscopy. The rest he can hold until the afternoon doses or the next day.     MD VIVIEN Benoit & [facility]  02/01/23

## 2023-02-02 NOTE — TELEPHONE ENCOUNTER
Call returned and informed per  Reviewed medications of patient. He should continue all of his medications normally until the night before his surgery. On the morning of his surgery he should only do 30 units of his Lantus in the morning, and skip his short acting insulin. They will probably tell him not to take any oral medications, but I would like to have him ask if he can take at least his Losartan, Nifedipine and coreg, otherwise his Blood pressures will be very elevated when he goes to get his colonoscopy. The rest he can hold until the afternoon doses or the next day.   Verbalized understanding

## 2023-02-06 LAB — COLONOSCOPY, EXTERNAL: NORMAL

## 2023-02-24 ENCOUNTER — TELEPHONE (OUTPATIENT)
Dept: FAMILY MEDICINE CLINIC | Age: 58
End: 2023-02-24

## 2023-02-24 NOTE — TELEPHONE ENCOUNTER
Pt's son is asking for paperwork stating pt's current health status for care giving purposes. Please advise pt's son and he will  paperwork when done.

## 2023-02-24 NOTE — TELEPHONE ENCOUNTER
Attempted to call pt son back. No answer and voice mail full. If he calls back,See what paperwork he is talking about

## 2023-03-13 ENCOUNTER — OFFICE VISIT (OUTPATIENT)
Dept: FAMILY MEDICINE CLINIC | Age: 58
End: 2023-03-13
Payer: MEDICAID

## 2023-03-13 VITALS
OXYGEN SATURATION: 96 % | RESPIRATION RATE: 18 BRPM | HEART RATE: 94 BPM | DIASTOLIC BLOOD PRESSURE: 72 MMHG | HEIGHT: 71 IN | TEMPERATURE: 98.9 F | WEIGHT: 220 LBS | BODY MASS INDEX: 30.8 KG/M2 | SYSTOLIC BLOOD PRESSURE: 122 MMHG

## 2023-03-13 DIAGNOSIS — Z94.0 KIDNEY REPLACED BY TRANSPLANT: ICD-10-CM

## 2023-03-13 DIAGNOSIS — Z89.511 S/P BKA (BELOW KNEE AMPUTATION), RIGHT (HCC): ICD-10-CM

## 2023-03-13 DIAGNOSIS — Z78.9 LANGUAGE BARRIER AFFECTING HEALTH CARE: ICD-10-CM

## 2023-03-13 DIAGNOSIS — Z79.4 TYPE 2 DIABETES MELLITUS WITH CHRONIC KIDNEY DISEASE, WITH LONG-TERM CURRENT USE OF INSULIN, UNSPECIFIED CKD STAGE (HCC): Primary | ICD-10-CM

## 2023-03-13 DIAGNOSIS — K21.9 GASTROESOPHAGEAL REFLUX DISEASE WITHOUT ESOPHAGITIS: ICD-10-CM

## 2023-03-13 DIAGNOSIS — I25.10 CORONARY ARTERY DISEASE INVOLVING NATIVE HEART WITHOUT ANGINA PECTORIS, UNSPECIFIED VESSEL OR LESION TYPE: ICD-10-CM

## 2023-03-13 DIAGNOSIS — N18.6 END STAGE RENAL DISEASE ON DIALYSIS (HCC): ICD-10-CM

## 2023-03-13 DIAGNOSIS — H91.93 BILATERAL DEAFNESS: ICD-10-CM

## 2023-03-13 DIAGNOSIS — E11.22 TYPE 2 DIABETES MELLITUS WITH CHRONIC KIDNEY DISEASE, WITH LONG-TERM CURRENT USE OF INSULIN, UNSPECIFIED CKD STAGE (HCC): Primary | ICD-10-CM

## 2023-03-13 DIAGNOSIS — Z99.2 END STAGE RENAL DISEASE ON DIALYSIS (HCC): ICD-10-CM

## 2023-03-13 DIAGNOSIS — E78.2 MIXED HYPERLIPIDEMIA: ICD-10-CM

## 2023-03-13 PROCEDURE — 3074F SYST BP LT 130 MM HG: CPT | Performed by: FAMILY MEDICINE

## 2023-03-13 PROCEDURE — 3078F DIAST BP <80 MM HG: CPT | Performed by: FAMILY MEDICINE

## 2023-03-13 PROCEDURE — 99214 OFFICE O/P EST MOD 30 MIN: CPT | Performed by: FAMILY MEDICINE

## 2023-03-13 RX ORDER — DEXLANSOPRAZOLE 60 MG/1
60 CAPSULE, DELAYED RELEASE ORAL
Qty: 90 CAPSULE | Refills: 1 | Status: SHIPPED | OUTPATIENT
Start: 2023-03-13

## 2023-03-13 NOTE — PROGRESS NOTES
[facility]    History of Present Illness:   Felicia Montes is a 62 y.o. male with history of CAD, HTN, PAD, GERD, DDM, ESRD, Deafness, HLD, Gout  CC: Follow up. History provided by patient and Records  : #[id number]    HPI:  Carpal Tunnel: Patient completed Carpal tunnel surgery of the left hand and that has improved significantly    CAD: Stable at this time. Right leg BKA: Patient is having upcoming ortho appointment for follow up and re-sizing. GERD: Patient is currently well controlled on Dexilant. Patient in the past denies using alternative prescription medications or anything over the counter in the past.    ESRD: Using home dialysis, and is going to be seeing his specialists on 3/27. Health Maintenance  Health Maintenance Due   Topic Date Due    Hepatitis B Vaccine (1 of 3 - Risk 3-dose series) Never done    Shingles Vaccine (1 of 2) Never done    DTaP/Tdap/Td series (1 - Tdap) Never done    Pneumococcal 0-64 years (3 - PPSV23 if available, else PCV20) 07/01/2020    Eye Exam Retinal or Dilated  02/14/2021    COVID-19 Vaccine (4 - Booster for Pfizer series) 11/15/2021    Flu Vaccine (1) 08/01/2022    A1C test (Diabetic or Prediabetic)  02/15/2023    Lipid Screen  02/15/2023       Past Medical, Family, and Social History:     Current Outpatient Medications on File Prior to Visit   Medication Sig Dispense Refill    lidocaine (LIDODERM) 5 % Apply patch to the affected area for 12 hours a day and remove for 12 hours a day. 30 Each 1    losartan (COZAAR) 50 mg tablet Take 1 Tablet by mouth daily. 90 Tablet 1    NIFEdipine ER (PROCARDIA XL) 60 mg ER tablet Take 1 Tablet by mouth daily. 90 Tablet 1    calcitRIOL (ROCALTROL) 0.5 mcg capsule Take 0.5 mcg by mouth daily. cinacalcet (SENSIPAR) 30 mg tablet Take 30 mg by mouth daily. sodium bicarbonate 650 mg tablet Take 2 Tablets by mouth two (2) times daily (with meals).  180 Tablet 1    insulin glargine (LANTUS,BASAGLAR) 100 unit/mL (3 mL) inpn 50 Units by SubCUTAneous route daily. 15 mL 5    insulin aspart U-100 (NOVOLOG) 100 unit/mL (3 mL) inpn 5 Units by SubCUTAneous route Before breakfast, lunch, and dinner. 15 mL 3    Insulin Needles, Disposable, 31 gauge x 5/16\" ndle Use with insulin as directed 200 Pen Needle 11    albuterol (PROVENTIL HFA, VENTOLIN HFA, PROAIR HFA) 90 mcg/actuation inhaler Take 2 Puffs by inhalation every four (4) hours as needed for Wheezing. carvediloL (COREG) 6.25 mg tablet Take 6.25 mg by mouth two (2) times a day. cholecalciferol (VITAMIN D3) (1000 Units /25 mcg) tablet Take 2,000 Units by mouth daily. hydrALAZINE (APRESOLINE) 50 mg tablet Take 25 mg by mouth three (3) times daily. sodium zirconium cyclosilicate (Lokelma) 10 gram powder packet Take 10 g by mouth daily. tacrolimus (PROGRAF) 1 mg capsule Take 1 mg by mouth two (2) times a day. mycophenolate (CELLCEPT) 500 mg tablet Take 500 mg by mouth two (2) times a day. Take 2 tablets by mouth twice a day      compr.stocking,knee,long,large (COMP STOCKING,KNEE,LONG,LARGE) misc Wear on the right leg for swelling. Goal 20-40 mmhg of pressure. 1 Package 1    [DISCONTINUED] dexlansoprazole (DEXILANT) 60 mg CpDB capsule (delayed release) Take 1 Capsule by mouth daily (with breakfast). 90 Capsule 1    naloxone (NARCAN) 4 mg/actuation nasal spray Use 1 spray intranasally, then discard. Repeat with new spray every 2 min as needed for opioid overdose symptoms, alternating nostrils. (Patient not taking: Reported on 3/13/2023) 2 Each 1     No current facility-administered medications on file prior to visit.        Patient Active Problem List   Diagnosis Code    Coronary artery disease I25.10    Deafness H91.90    HTN (hypertension) I10    S/P BKA (below knee amputation), right (HCC) Z89.511    End stage renal disease on dialysis ([facility] N18.6, Z99.2    Anemia due to chronic kidney disease N18.9, D63.1    CAD (coronary artery disease) I25.10    DM type 2 causing renal disease ([facility]) E11.29    Hyperlipidemia E78.5    GERD (gastroesophageal reflux disease) K21.9    Gout M10.9    Anemia D64.9    Dizziness R42    PAD (peripheral artery disease) ([facility]) I73.9    Kidney replaced by transplant Z94.0    Controlled substance agreement signed Z79.899       Social History     Socioeconomic History    Marital status: SINGLE   Tobacco Use    Smoking status: Never     Passive exposure: Yes    Smokeless tobacco: Never   Substance and Sexual Activity    Alcohol use: Yes     Alcohol/week: 4.2 standard drinks     Types: 5 Cans of beer per week    Drug use: No    Sexual activity: Not Currently     Partners: Female        Review of Systems   ROS    Objective:   Visit Vitals  /72 (BP 1 Location: Right arm, BP Patient Position: Sitting, BP Cuff Size: Large adult)   Pulse 94   Temp 98.9 °F (37.2 °C) (Oral)   Resp 18   Ht 5' 11\" (1.803 m)   Wt 220 lb (99.8 kg)   SpO2 96%   BMI 30.68 kg/m²        Physical Exam  Vitals and nursing note reviewed. Constitutional:       Appearance: Normal appearance. HENT:      Head: Normocephalic and atraumatic. Cardiovascular:      Rate and Rhythm: Normal rate and regular rhythm. Pulses: Normal pulses. Heart sounds: Murmur heard. Pulmonary:      Effort: Pulmonary effort is normal.      Breath sounds: Normal breath sounds. Abdominal:      General: Abdomen is flat. Bowel sounds are normal.      Palpations: Abdomen is soft. Musculoskeletal:         General: Normal range of motion. Cervical back: Normal range of motion and neck supple. Skin:     General: Skin is warm and dry. Neurological:      Mental Status: He is alert. Pertinent Labs/Studies:      Assessment and orders:       ICD-10-CM ICD-9-CM    1. Type 2 diabetes mellitus with chronic kidney disease, with long-term current use of insulin, unspecified CKD stage ([facility]  E11.22 250.40     Z79.4 585. 9      V58.67       2.  End stage renal disease on dialysis ([facility]  N18.6 585.6     Z99.2 V45.11       3. Coronary artery disease involving native heart without angina pectoris, unspecified vessel or lesion type  I25.10 414.01       4. Gastroesophageal reflux disease without esophagitis  K21.9 530.81 dexlansoprazole (DEXILANT) 60 mg CpDB capsule (delayed release)      5. S/P BKA (below knee amputation), right ([facility]  Z89.511 V49.75       6. Mixed hyperlipidemia  E78.2 272.2       7. Kidney replaced by transplant  Z94.0 V42.0       8. Bilateral deafness  H91.93 389.9 SIGN LANG/ORAL       9. Language barrier affecting health care  Z78.9 V49.89 SIGN LANG/ORAL         Diagnoses and all orders for this visit:    1. Type 2 diabetes mellitus with chronic kidney disease, with long-term current use of insulin, unspecified CKD stage ([facility]    2. End stage renal disease on dialysis ([facility]    3. Coronary artery disease involving native heart without angina pectoris, unspecified vessel or lesion type    4. Gastroesophageal reflux disease without esophagitis  -     dexlansoprazole (DEXILANT) 60 mg CpDB capsule (delayed release); Take 1 Capsule by mouth daily (with breakfast). 5. S/P BKA (below knee amputation), right ([facility]    6. Mixed hyperlipidemia    7. Kidney replaced by transplant    8. Bilateral deafness  -     SIGN LANG/ORAL     9. Language barrier affecting health care  -     SIGN LANG/ORAL     Follow-up and Dispositions    Return in about 3 months (around 6/13/2023). I have discussed the diagnosis with the patient and the intended plan as seen in the above orders. Social history, medical history, and labs were reviewed. The patient has received an after-visit summary and questions were answered concerning future plans. I have discussed medication side effects and warnings with the patient as well.     MD VIVIEN Smith & [facility]  03/13/23

## 2023-03-13 NOTE — PROGRESS NOTES
1. Have you been to the ER, urgent care clinic since your last visit? Hospitalized since your last visit?no    2. Have you seen or consulted any other health care providers outside of the [address] [place] [address] since your last visit? Including any pap smears or colon screening.  Yes [facility] Maintenance Due   Topic Date Due    Hepatitis B Vaccine (1 of 3 - Risk 3-dose series) Never done    Shingles Vaccine (1 of 2) Never done    DTaP/Tdap/Td series (1 - Tdap) Never done    Pneumococcal 0-64 years (3 - PPSV23 if available, else PCV20) 07/01/2020    Eye Exam Retinal or Dilated  02/14/2021    COVID-19 Vaccine (4 - Booster for Pfizer series) 11/15/2021    Flu Vaccine (1) 08/01/2022    A1C test (Diabetic or Prediabetic)  02/15/2023    Lipid Screen  02/15/2023

## 2023-03-25 LAB
HBA1C MFR BLD HPLC: 5.3 %
HBA1C MFR BLD HPLC: 5.3 %

## 2023-03-28 DIAGNOSIS — Z79.4 TYPE 2 DIABETES MELLITUS WITH CHRONIC KIDNEY DISEASE ON CHRONIC DIALYSIS, WITH LONG-TERM CURRENT USE OF INSULIN (HCC): ICD-10-CM

## 2023-03-28 DIAGNOSIS — N18.6 TYPE 2 DIABETES MELLITUS WITH CHRONIC KIDNEY DISEASE ON CHRONIC DIALYSIS, WITH LONG-TERM CURRENT USE OF INSULIN (HCC): ICD-10-CM

## 2023-03-28 DIAGNOSIS — E11.22 TYPE 2 DIABETES MELLITUS WITH CHRONIC KIDNEY DISEASE ON CHRONIC DIALYSIS, WITH LONG-TERM CURRENT USE OF INSULIN (HCC): ICD-10-CM

## 2023-03-28 DIAGNOSIS — Z99.2 TYPE 2 DIABETES MELLITUS WITH CHRONIC KIDNEY DISEASE ON CHRONIC DIALYSIS, WITH LONG-TERM CURRENT USE OF INSULIN (HCC): ICD-10-CM

## 2023-03-28 RX ORDER — INSULIN GLARGINE 100 [IU]/ML
50 INJECTION, SOLUTION SUBCUTANEOUS DAILY
Qty: 15 ML | Refills: 5 | Status: SHIPPED | OUTPATIENT
Start: 2023-03-28

## 2023-04-17 RX ORDER — INSULIN GLARGINE 100 [IU]/ML
INJECTION, SOLUTION SUBCUTANEOUS
Qty: 15 ML | Refills: 0 | OUTPATIENT
Start: 2023-04-17

## 2023-05-22 ENCOUNTER — TELEPHONE (OUTPATIENT)
Facility: CLINIC | Age: 58
End: 2023-05-22

## 2023-05-22 NOTE — TELEPHONE ENCOUNTER
Pt is having surgery tomorrow and they need to discuss his medications. Please contact pt's dtr-in-law today.

## 2023-05-22 NOTE — TELEPHONE ENCOUNTER
Amara Avendano, daughter in law called. Stated patient is having dialysis access provision tomorrow at [facility] and requesting to know whether to hold novolog and aspirin. Notified patient is no longer on aspirin that we have listed. Verbalizes that [place] he's not on that anymore\".

## 2023-06-22 ENCOUNTER — OFFICE VISIT (OUTPATIENT)
Facility: CLINIC | Age: 58
End: 2023-06-22
Payer: MEDICARE

## 2023-06-22 VITALS
HEIGHT: 71 IN | SYSTOLIC BLOOD PRESSURE: 144 MMHG | TEMPERATURE: 98.1 F | BODY MASS INDEX: 34.16 KG/M2 | OXYGEN SATURATION: 99 % | DIASTOLIC BLOOD PRESSURE: 71 MMHG | WEIGHT: 244 LBS | RESPIRATION RATE: 20 BRPM | HEART RATE: 78 BPM

## 2023-06-22 DIAGNOSIS — N18.6 TYPE 2 DIABETES MELLITUS WITH CHRONIC KIDNEY DISEASE ON CHRONIC DIALYSIS, WITH LONG-TERM CURRENT USE OF INSULIN (HCC): ICD-10-CM

## 2023-06-22 DIAGNOSIS — I25.10 CORONARY ARTERY DISEASE INVOLVING NATIVE CORONARY ARTERY OF NATIVE HEART WITHOUT ANGINA PECTORIS: Primary | ICD-10-CM

## 2023-06-22 DIAGNOSIS — M10.9 GOUT, UNSPECIFIED CAUSE, UNSPECIFIED CHRONICITY, UNSPECIFIED SITE: ICD-10-CM

## 2023-06-22 DIAGNOSIS — Z79.4 TYPE 2 DIABETES MELLITUS WITH CHRONIC KIDNEY DISEASE ON CHRONIC DIALYSIS, WITH LONG-TERM CURRENT USE OF INSULIN (HCC): ICD-10-CM

## 2023-06-22 DIAGNOSIS — K21.9 GASTROESOPHAGEAL REFLUX DISEASE WITHOUT ESOPHAGITIS: ICD-10-CM

## 2023-06-22 DIAGNOSIS — E78.2 MIXED HYPERLIPIDEMIA: ICD-10-CM

## 2023-06-22 DIAGNOSIS — Z99.2 END STAGE RENAL DISEASE ON DIALYSIS (HCC): ICD-10-CM

## 2023-06-22 DIAGNOSIS — H91.93 BILATERAL DEAFNESS: ICD-10-CM

## 2023-06-22 DIAGNOSIS — E11.22 TYPE 2 DIABETES MELLITUS WITH CHRONIC KIDNEY DISEASE ON CHRONIC DIALYSIS, WITH LONG-TERM CURRENT USE OF INSULIN (HCC): ICD-10-CM

## 2023-06-22 DIAGNOSIS — I73.9 PAD (PERIPHERAL ARTERY DISEASE) (HCC): ICD-10-CM

## 2023-06-22 DIAGNOSIS — D89.9 DISORDER INVOLVING THE IMMUNE MECHANISM, UNSPECIFIED (HCC): ICD-10-CM

## 2023-06-22 DIAGNOSIS — Z23 IMMUNIZATION DUE: ICD-10-CM

## 2023-06-22 DIAGNOSIS — Z99.2 TYPE 2 DIABETES MELLITUS WITH CHRONIC KIDNEY DISEASE ON CHRONIC DIALYSIS, WITH LONG-TERM CURRENT USE OF INSULIN (HCC): ICD-10-CM

## 2023-06-22 DIAGNOSIS — N18.6 END STAGE RENAL DISEASE ON DIALYSIS (HCC): ICD-10-CM

## 2023-06-22 PROCEDURE — 3046F HEMOGLOBIN A1C LEVEL >9.0%: CPT | Performed by: FAMILY MEDICINE

## 2023-06-22 PROCEDURE — 3017F COLORECTAL CA SCREEN DOC REV: CPT | Performed by: FAMILY MEDICINE

## 2023-06-22 PROCEDURE — 2022F DILAT RTA XM EVC RTNOPTHY: CPT | Performed by: FAMILY MEDICINE

## 2023-06-22 PROCEDURE — G8428 CUR MEDS NOT DOCUMENT: HCPCS | Performed by: FAMILY MEDICINE

## 2023-06-22 PROCEDURE — 3077F SYST BP >= 140 MM HG: CPT | Performed by: FAMILY MEDICINE

## 2023-06-22 PROCEDURE — 1036F TOBACCO NON-USER: CPT | Performed by: FAMILY MEDICINE

## 2023-06-22 PROCEDURE — G8417 CALC BMI ABV UP PARAM F/U: HCPCS | Performed by: FAMILY MEDICINE

## 2023-06-22 PROCEDURE — 3078F DIAST BP <80 MM HG: CPT | Performed by: FAMILY MEDICINE

## 2023-06-22 PROCEDURE — 99214 OFFICE O/P EST MOD 30 MIN: CPT | Performed by: FAMILY MEDICINE

## 2023-06-22 RX ORDER — ZOSTER VACCINE RECOMBINANT, ADJUVANTED 50 MCG/0.5
0.5 KIT INTRAMUSCULAR SEE ADMIN INSTRUCTIONS
Qty: 0.5 ML | Refills: 1 | Status: SHIPPED | OUTPATIENT
Start: 2023-06-22 | End: 2023-12-19

## 2023-06-22 SDOH — ECONOMIC STABILITY: FOOD INSECURITY: WITHIN THE PAST 12 MONTHS, YOU WORRIED THAT YOUR FOOD WOULD RUN OUT BEFORE YOU GOT MONEY TO BUY MORE.: NEVER TRUE

## 2023-06-22 SDOH — ECONOMIC STABILITY: TRANSPORTATION INSECURITY
IN THE PAST 12 MONTHS, HAS LACK OF TRANSPORTATION KEPT YOU FROM MEETINGS, WORK, OR FROM GETTING THINGS NEEDED FOR DAILY LIVING?: NO

## 2023-06-22 SDOH — ECONOMIC STABILITY: INCOME INSECURITY: IN THE LAST 12 MONTHS, WAS THERE A TIME WHEN YOU WERE NOT ABLE TO PAY THE MORTGAGE OR RENT ON TIME?: NO

## 2023-06-22 SDOH — ECONOMIC STABILITY: HOUSING INSECURITY
IN THE LAST 12 MONTHS, WAS THERE A TIME WHEN YOU DID NOT HAVE A STEADY PLACE TO SLEEP OR SLEPT IN A SHELTER (INCLUDING NOW)?: NO

## 2023-06-22 SDOH — ECONOMIC STABILITY: FOOD INSECURITY: WITHIN THE PAST 12 MONTHS, THE FOOD YOU BOUGHT JUST DIDN'T LAST AND YOU DIDN'T HAVE MONEY TO GET MORE.: NEVER TRUE

## 2023-06-22 SDOH — ECONOMIC STABILITY: TRANSPORTATION INSECURITY
IN THE PAST 12 MONTHS, HAS THE LACK OF TRANSPORTATION KEPT YOU FROM MEDICAL APPOINTMENTS OR FROM GETTING MEDICATIONS?: NO

## 2023-06-22 ASSESSMENT — PATIENT HEALTH QUESTIONNAIRE - PHQ9
SUM OF ALL RESPONSES TO PHQ QUESTIONS 1-9: 0
1. LITTLE INTEREST OR PLEASURE IN DOING THINGS: 0
SUM OF ALL RESPONSES TO PHQ QUESTIONS 1-9: 0
SUM OF ALL RESPONSES TO PHQ QUESTIONS 1-9: 0
2. FEELING DOWN, DEPRESSED OR HOPELESS: 0
SUM OF ALL RESPONSES TO PHQ9 QUESTIONS 1 & 2: 0
SUM OF ALL RESPONSES TO PHQ QUESTIONS 1-9: 0

## 2023-06-22 ASSESSMENT — ENCOUNTER SYMPTOMS: CHEST TIGHTNESS: 0

## 2023-06-22 ASSESSMENT — SOCIAL DETERMINANTS OF HEALTH (SDOH): HOW HARD IS IT FOR YOU TO PAY FOR THE VERY BASICS LIKE FOOD, HOUSING, MEDICAL CARE, AND HEATING?: NOT HARD AT ALL

## 2023-06-22 NOTE — CONSULTS
Session ID: [id number]  Request ID: [id number]  Language: ASL  Status: Fulfilled   ID: #[id number]   Name: Kena Hopkins

## 2023-06-22 NOTE — PROGRESS NOTES
[facility]    History of Present Illness:   Jaquan Gatica is a 62 y.o. male with history of CAD, HTN, PAD, GERD, DDM, ESRD, Deafness, HLD, Gout  CC: Follow up  History provided by patient and Records  : #[id number]    HPI:  Diabetes Follow up: Overall the patient feels well with good energy level. Current Medications: insulin aspart - 100 UNIT/ML  Lantus SoloStar Sopn - 100 UNIT/ML. Insulin dependence: Yes   Frequency of home glucose testing: Daily   Blood Sugar range at home: <150    Last eye exam: In past 12 months. Last foot exam: This year. Polyuria, polyphagia or polydipsia: No   Retinopathy: No   Neuropathy SX: No   Low blood sugar symptoms: No   Dietary compliance: compliant most of the time   Medication compliance: compliant most of the time   On ASA: No   Tobacco Use: No   Depression: No     Wt Readings from Last 3 Encounters:   06/22/23 244 lb (110.7 kg)   03/13/23 220 lb (99.8 kg)   01/26/23 220 lb 6.4 oz (100 kg)      No results found for: HBA1C, FTC4SPEZ   No results found for: MCACR, MCA2, MCAU, MCAU2    No results found for: JOE, CREAPOC, CREA      CAD: Stable at this time. Right Leg BKA: Getting his New prosthetic next week    GERD: Patient is currently well controlled on Dexilant. Patient in the past denies using alternative prescription medications or anything over the counter in the past.     ESRD: Using home dialysis, Had a revision following appendectomy.     Health Maintenance  Health Maintenance Due   Topic Date Due    DTaP/Tdap/Td vaccine (1 - Tdap) Never done    Shingles vaccine (1 of 2) Never done    Colorectal Cancer Screen  Never done    Diabetic retinal exam  02/14/2020    Pneumococcal 0-64 years Vaccine (3 - PPSV23 if available, else PCV20) 07/01/2020    COVID-19 Vaccine (4 - Booster for Le Peter series) 11/15/2021    Lipids  02/15/2023    Annual Wellness Visit (AWV)  07/06/2023       Past Medical, Family, and Social History:     Current

## 2023-06-22 NOTE — PROGRESS NOTES
Chief Complaint   Patient presents with    Follow-up Chronic Condition     1. \"Have you been to the ER, urgent care clinic since your last visit? Hospitalized since your last visit? \" No    2. \"Have you seen or consulted any other health care providers outside of the [address] [place] [address] since your last visit? \" No     3. For patients aged 39-70: Has the patient had a colonoscopy / FIT/ Cologuard? Yes In media. Printed to have attached to HM. If the patient is female:    4. For patients aged 41-77: Has the patient had a mammogram within the past 2 years? N/A      5. For patients aged 21-65: Has the patient had a pap smear?  N/A    Health Maintenance Due   Topic Date Due    HIV screen  Never done    DTaP/Tdap/Td vaccine (1 - Tdap) Never done    Shingles vaccine (1 of 2) Never done    Colorectal Cancer Screen  Never done    Diabetic retinal exam  02/14/2020    Pneumococcal 0-64 years Vaccine (3 - PPSV23 if available, else PCV20) 07/01/2020    COVID-19 Vaccine (4 - Booster for [employer] series) 11/15/2021    Lipids  02/15/2023    Annual Wellness Visit (AWV)  07/06/2023